# Patient Record
Sex: FEMALE | Race: WHITE | NOT HISPANIC OR LATINO | Employment: OTHER | URBAN - METROPOLITAN AREA
[De-identification: names, ages, dates, MRNs, and addresses within clinical notes are randomized per-mention and may not be internally consistent; named-entity substitution may affect disease eponyms.]

---

## 2017-01-17 ENCOUNTER — AMBULATORY - HEALTHEAST (OUTPATIENT)
Dept: CARDIOLOGY | Facility: CLINIC | Age: 67
End: 2017-01-17

## 2017-01-17 ENCOUNTER — OFFICE VISIT - HEALTHEAST (OUTPATIENT)
Dept: CARDIOLOGY | Facility: CLINIC | Age: 67
End: 2017-01-17

## 2017-01-17 DIAGNOSIS — I48.92 ATRIAL FLUTTER (H): ICD-10-CM

## 2017-01-17 DIAGNOSIS — I10 ESSENTIAL HYPERTENSION: ICD-10-CM

## 2017-01-17 ASSESSMENT — MIFFLIN-ST. JEOR: SCORE: 1429.98

## 2017-03-14 ENCOUNTER — AMBULATORY - HEALTHEAST (OUTPATIENT)
Dept: CARDIOLOGY | Facility: CLINIC | Age: 67
End: 2017-03-14

## 2017-03-14 ENCOUNTER — OFFICE VISIT - HEALTHEAST (OUTPATIENT)
Dept: CARDIOLOGY | Facility: CLINIC | Age: 67
End: 2017-03-14

## 2017-03-14 DIAGNOSIS — I42.9 CARDIOMYOPATHY (H): ICD-10-CM

## 2017-03-14 DIAGNOSIS — I48.0 PAROXYSMAL ATRIAL FIBRILLATION (H): ICD-10-CM

## 2017-03-14 DIAGNOSIS — R06.09 DOE (DYSPNEA ON EXERTION): ICD-10-CM

## 2017-03-14 DIAGNOSIS — I67.1 CEREBRAL ANEURYSM: ICD-10-CM

## 2017-03-14 DIAGNOSIS — I10 ESSENTIAL HYPERTENSION: ICD-10-CM

## 2017-03-14 DIAGNOSIS — I48.3 TYPICAL ATRIAL FLUTTER (H): ICD-10-CM

## 2017-03-14 ASSESSMENT — MIFFLIN-ST. JEOR: SCORE: 1452.66

## 2017-03-29 ENCOUNTER — HOSPITAL ENCOUNTER (OUTPATIENT)
Dept: CARDIOLOGY | Facility: CLINIC | Age: 67
Discharge: HOME | End: 2017-03-29
Attending: NURSE PRACTITIONER

## 2017-03-29 DIAGNOSIS — I48.0 PAROXYSMAL ATRIAL FIBRILLATION (H): ICD-10-CM

## 2017-03-29 DIAGNOSIS — I42.9 CARDIOMYOPATHY (H): ICD-10-CM

## 2017-03-29 LAB
AORTIC ROOT: 2.3 CM
AORTIC ROOT: 2.3 CM
BSA FOR ECHO PROCEDURE: 2.08 M2
CV BLOOD PRESSURE: NORMAL MMHG
CV ECHO HEIGHT: 62 IN
CV ECHO WEIGHT: 219 LBS
EJECTION FRACTION: 55 % (ref 55–75)
FRACTIONAL SHORTENING: 29.1 % (ref 28–44)
INTERVENTRICULAR SEPTUM IN END DIASTOLE: 0.92 CM (ref 0.6–0.9)
IVS/PW RATIO: 0.9
LEFT ATRIUM SIZE: 3.4 CM
LEFT ATRIUM TO AORTIC ROOT RATIO: 1.48 NO UNITS
LEFT VENTRICLE DIASTOLIC VOLUME INDEX: 32.2 CM3/M2 (ref 34–74)
LEFT VENTRICLE DIASTOLIC VOLUME: 67 CM3 (ref 46–106)
LEFT VENTRICLE MASS INDEX: 66.6 G/M2
LEFT VENTRICLE SYSTOLIC VOLUME INDEX: 14.4 CM3/M2 (ref 11–31)
LEFT VENTRICLE SYSTOLIC VOLUME: 30 CM3 (ref 14–42)
LEFT VENTRICULAR INTERNAL DIMENSION IN DIASTOLE: 4.4 CM (ref 3.8–5.2)
LEFT VENTRICULAR INTERNAL DIMENSION IN SYSTOLE: 3.12 CM (ref 2.2–3.5)
LEFT VENTRICULAR MASS: 138.5 G
LEFT VENTRICULAR POSTERIOR WALL IN END DIASTOLE: 0.99 CM (ref 0.6–0.9)
NUC REST DIASTOLIC VOLUME INDEX: 3504 LBS
NUC REST SYSTOLIC VOLUME INDEX: 62 IN

## 2017-03-29 ASSESSMENT — MIFFLIN-ST. JEOR: SCORE: 1456.63

## 2017-04-19 ENCOUNTER — AMBULATORY - HEALTHEAST (OUTPATIENT)
Dept: CARDIOLOGY | Facility: CLINIC | Age: 67
End: 2017-04-19

## 2017-04-24 ENCOUNTER — OFFICE VISIT - HEALTHEAST (OUTPATIENT)
Dept: CARDIOLOGY | Facility: CLINIC | Age: 67
End: 2017-04-24

## 2017-04-24 ENCOUNTER — AMBULATORY - HEALTHEAST (OUTPATIENT)
Dept: CARDIOLOGY | Facility: CLINIC | Age: 67
End: 2017-04-24

## 2017-04-24 DIAGNOSIS — I10 ESSENTIAL HYPERTENSION WITH GOAL BLOOD PRESSURE LESS THAN 140/90: ICD-10-CM

## 2017-04-24 DIAGNOSIS — I48.0 PAROXYSMAL ATRIAL FIBRILLATION (H): ICD-10-CM

## 2017-04-24 DIAGNOSIS — I48.3 TYPICAL ATRIAL FLUTTER (H): ICD-10-CM

## 2017-04-24 ASSESSMENT — MIFFLIN-ST. JEOR: SCORE: 1461.16

## 2017-06-01 ENCOUNTER — COMMUNICATION - HEALTHEAST (OUTPATIENT)
Dept: CARDIOLOGY | Facility: CLINIC | Age: 67
End: 2017-06-01

## 2017-06-12 ENCOUNTER — COMMUNICATION - HEALTHEAST (OUTPATIENT)
Dept: CARDIOLOGY | Facility: CLINIC | Age: 67
End: 2017-06-12

## 2017-07-12 ENCOUNTER — AMBULATORY - HEALTHEAST (OUTPATIENT)
Dept: CARDIOLOGY | Facility: CLINIC | Age: 67
End: 2017-07-12

## 2017-07-14 ENCOUNTER — OFFICE VISIT - HEALTHEAST (OUTPATIENT)
Dept: CARDIOLOGY | Facility: CLINIC | Age: 67
End: 2017-07-14

## 2017-07-14 ENCOUNTER — AMBULATORY - HEALTHEAST (OUTPATIENT)
Dept: CARDIOLOGY | Facility: CLINIC | Age: 67
End: 2017-07-14

## 2017-07-14 DIAGNOSIS — I20.89 ANGINAL EQUIVALENT (H): ICD-10-CM

## 2017-07-14 DIAGNOSIS — I48.0 PAROXYSMAL ATRIAL FIBRILLATION (H): ICD-10-CM

## 2017-07-14 DIAGNOSIS — R06.09 DOE (DYSPNEA ON EXERTION): ICD-10-CM

## 2017-07-14 DIAGNOSIS — I48.3 TYPICAL ATRIAL FLUTTER (H): ICD-10-CM

## 2017-07-14 DIAGNOSIS — I10 ESSENTIAL HYPERTENSION WITH GOAL BLOOD PRESSURE LESS THAN 140/90: ICD-10-CM

## 2017-07-14 ASSESSMENT — MIFFLIN-ST. JEOR: SCORE: 1447.56

## 2017-07-18 ENCOUNTER — RECORDS - HEALTHEAST (OUTPATIENT)
Dept: LAB | Facility: CLINIC | Age: 67
End: 2017-07-18

## 2017-07-18 LAB
CHOLEST SERPL-MCNC: 173 MG/DL
FASTING STATUS PATIENT QL REPORTED: NO
HDLC SERPL-MCNC: 43 MG/DL
LDLC SERPL CALC-MCNC: 105 MG/DL
TRIGL SERPL-MCNC: 125 MG/DL

## 2017-07-20 ENCOUNTER — AMBULATORY - HEALTHEAST (OUTPATIENT)
Dept: ADMINISTRATIVE | Facility: REHABILITATION | Age: 67
End: 2017-07-20

## 2017-07-20 DIAGNOSIS — H81.391 OTHER PERIPHERAL VERTIGO, RIGHT EAR: ICD-10-CM

## 2017-07-21 ENCOUNTER — OFFICE VISIT - HEALTHEAST (OUTPATIENT)
Dept: OCCUPATIONAL THERAPY | Facility: REHABILITATION | Age: 67
End: 2017-07-21

## 2017-07-21 DIAGNOSIS — R26.81 UNSTEADINESS ON FEET: ICD-10-CM

## 2017-07-21 DIAGNOSIS — H81.8X1 UNILATERAL VESTIBULAR WEAKNESS, RIGHT: ICD-10-CM

## 2017-07-21 DIAGNOSIS — R42 DIZZINESS: ICD-10-CM

## 2017-07-27 ENCOUNTER — COMMUNICATION - HEALTHEAST (OUTPATIENT)
Dept: CARDIOLOGY | Facility: CLINIC | Age: 67
End: 2017-07-27

## 2017-08-01 ENCOUNTER — HOSPITAL ENCOUNTER (OUTPATIENT)
Dept: CT IMAGING | Facility: CLINIC | Age: 67
Discharge: HOME | End: 2017-08-01
Attending: NURSE PRACTITIONER

## 2017-08-01 DIAGNOSIS — I20.89 ANGINAL EQUIVALENT (H): ICD-10-CM

## 2017-08-01 ASSESSMENT — MIFFLIN-ST. JEOR: SCORE: 1422.61

## 2017-08-03 ENCOUNTER — COMMUNICATION - HEALTHEAST (OUTPATIENT)
Dept: CARDIOLOGY | Facility: CLINIC | Age: 67
End: 2017-08-03

## 2017-08-04 ENCOUNTER — COMMUNICATION - HEALTHEAST (OUTPATIENT)
Dept: CARDIOLOGY | Facility: CLINIC | Age: 67
End: 2017-08-04

## 2017-08-04 ENCOUNTER — OFFICE VISIT - HEALTHEAST (OUTPATIENT)
Dept: OCCUPATIONAL THERAPY | Facility: REHABILITATION | Age: 67
End: 2017-08-04

## 2017-08-04 DIAGNOSIS — R42 DIZZINESS: ICD-10-CM

## 2017-08-04 DIAGNOSIS — R26.81 UNSTEADINESS ON FEET: ICD-10-CM

## 2017-08-04 DIAGNOSIS — I48.0 PAROXYSMAL ATRIAL FIBRILLATION (H): ICD-10-CM

## 2017-08-04 DIAGNOSIS — H81.8X1 UNILATERAL VESTIBULAR WEAKNESS, RIGHT: ICD-10-CM

## 2017-08-08 ENCOUNTER — HOSPITAL ENCOUNTER (OUTPATIENT)
Dept: CT IMAGING | Facility: CLINIC | Age: 67
Discharge: HOME | End: 2017-08-08
Attending: NURSE PRACTITIONER

## 2017-08-08 DIAGNOSIS — I20.89 ANGINAL EQUIVALENT (H): ICD-10-CM

## 2017-08-08 LAB
BSA FOR ECHO PROCEDURE: 2.05 M2
CV CALCIUM SCORE AGATSTON LM: 21
CV CALCIUM SCORING AGATSON LAD: 0
CV CALCIUM SCORING AGATSTON CX: 0
CV CALCIUM SCORING AGATSTON RCA: 32
CV CALCIUM SCORING AGATSTON TOTAL: 53

## 2017-08-08 ASSESSMENT — MIFFLIN-ST. JEOR: SCORE: 1422.61

## 2017-08-09 ENCOUNTER — COMMUNICATION - HEALTHEAST (OUTPATIENT)
Dept: CARDIOLOGY | Facility: CLINIC | Age: 67
End: 2017-08-09

## 2017-08-09 DIAGNOSIS — I48.19 PERSISTENT ATRIAL FIBRILLATION (H): ICD-10-CM

## 2017-08-09 DIAGNOSIS — R93.2 ABNORMAL CT OF LIVER: ICD-10-CM

## 2017-08-11 ENCOUNTER — HOSPITAL ENCOUNTER (OUTPATIENT)
Dept: ULTRASOUND IMAGING | Facility: CLINIC | Age: 67
Discharge: HOME | End: 2017-08-11
Attending: NURSE PRACTITIONER

## 2017-08-11 ENCOUNTER — HOSPITAL ENCOUNTER (OUTPATIENT)
Dept: CARDIOLOGY | Facility: CLINIC | Age: 67
Discharge: HOME | End: 2017-08-11
Attending: NURSE PRACTITIONER

## 2017-08-11 DIAGNOSIS — I48.19 PERSISTENT ATRIAL FIBRILLATION (H): ICD-10-CM

## 2017-08-11 DIAGNOSIS — R93.2 ABNORMAL CT OF LIVER: ICD-10-CM

## 2017-08-24 ENCOUNTER — RECORDS - HEALTHEAST (OUTPATIENT)
Dept: ADMINISTRATIVE | Facility: OTHER | Age: 67
End: 2017-08-24

## 2017-08-29 ENCOUNTER — AMBULATORY - HEALTHEAST (OUTPATIENT)
Dept: CARDIOLOGY | Facility: CLINIC | Age: 67
End: 2017-08-29

## 2017-08-29 ENCOUNTER — RECORDS - HEALTHEAST (OUTPATIENT)
Dept: ADMINISTRATIVE | Facility: OTHER | Age: 67
End: 2017-08-29

## 2017-08-30 ENCOUNTER — OFFICE VISIT - HEALTHEAST (OUTPATIENT)
Dept: CARDIOLOGY | Facility: CLINIC | Age: 67
End: 2017-08-30

## 2017-08-30 DIAGNOSIS — I48.0 PAROXYSMAL ATRIAL FIBRILLATION (H): ICD-10-CM

## 2017-08-30 DIAGNOSIS — I10 ESSENTIAL HYPERTENSION WITH GOAL BLOOD PRESSURE LESS THAN 140/90: ICD-10-CM

## 2017-08-30 DIAGNOSIS — I48.3 TYPICAL ATRIAL FLUTTER (H): ICD-10-CM

## 2017-08-30 LAB
ATRIAL RATE - MUSE: 102 BPM
DIASTOLIC BLOOD PRESSURE - MUSE: NORMAL MMHG
INTERPRETATION ECG - MUSE: NORMAL
P AXIS - MUSE: NORMAL DEGREES
PR INTERVAL - MUSE: NORMAL MS
QRS DURATION - MUSE: 90 MS
QT - MUSE: 344 MS
QTC - MUSE: 439 MS
R AXIS - MUSE: -10 DEGREES
SYSTOLIC BLOOD PRESSURE - MUSE: NORMAL MMHG
T AXIS - MUSE: 14 DEGREES
VENTRICULAR RATE- MUSE: 98 BPM

## 2017-08-30 ASSESSMENT — MIFFLIN-ST. JEOR: SCORE: 1432.14

## 2017-08-31 ENCOUNTER — SURGERY - HEALTHEAST (OUTPATIENT)
Dept: CARDIOLOGY | Facility: CLINIC | Age: 67
End: 2017-08-31

## 2017-08-31 ENCOUNTER — AMBULATORY - HEALTHEAST (OUTPATIENT)
Dept: CARDIOLOGY | Facility: CLINIC | Age: 67
End: 2017-08-31

## 2017-08-31 ENCOUNTER — RECORDS - HEALTHEAST (OUTPATIENT)
Dept: ADMINISTRATIVE | Facility: OTHER | Age: 67
End: 2017-08-31

## 2017-08-31 ENCOUNTER — COMMUNICATION - HEALTHEAST (OUTPATIENT)
Dept: CARDIOLOGY | Facility: CLINIC | Age: 67
End: 2017-08-31

## 2017-08-31 ENCOUNTER — RECORDS - HEALTHEAST (OUTPATIENT)
Dept: SLEEP MEDICINE | Facility: CLINIC | Age: 67
End: 2017-08-31

## 2017-08-31 DIAGNOSIS — I48.0 PAROXYSMAL ATRIAL FIBRILLATION (H): ICD-10-CM

## 2017-09-05 ENCOUNTER — COMMUNICATION - HEALTHEAST (OUTPATIENT)
Dept: CARDIOLOGY | Facility: CLINIC | Age: 67
End: 2017-09-05

## 2017-09-05 ENCOUNTER — AMBULATORY - HEALTHEAST (OUTPATIENT)
Dept: CARDIOLOGY | Facility: CLINIC | Age: 67
End: 2017-09-05

## 2017-09-05 DIAGNOSIS — I48.0 PAROXYSMAL A-FIB (H): ICD-10-CM

## 2017-09-06 ENCOUNTER — HOSPITAL ENCOUNTER (OUTPATIENT)
Dept: CT IMAGING | Facility: CLINIC | Age: 67
Discharge: HOME | End: 2017-09-06
Attending: INTERNAL MEDICINE

## 2017-09-06 DIAGNOSIS — I48.0 PAROXYSMAL ATRIAL FIBRILLATION (H): ICD-10-CM

## 2017-09-06 ASSESSMENT — MIFFLIN-ST. JEOR: SCORE: 1422.61

## 2017-09-08 ENCOUNTER — COMMUNICATION - HEALTHEAST (OUTPATIENT)
Dept: SLEEP MEDICINE | Facility: CLINIC | Age: 67
End: 2017-09-08

## 2017-09-08 LAB — BSA FOR ECHO PROCEDURE: 2.1 M2

## 2017-09-12 ENCOUNTER — SURGERY - HEALTHEAST (OUTPATIENT)
Dept: CARDIOLOGY | Facility: CLINIC | Age: 67
End: 2017-09-12

## 2017-09-12 ENCOUNTER — ANESTHESIA - HEALTHEAST (OUTPATIENT)
Dept: CARDIOLOGY | Facility: CLINIC | Age: 67
End: 2017-09-12

## 2017-09-12 ASSESSMENT — MIFFLIN-ST. JEOR: SCORE: 1429.87

## 2017-09-14 ENCOUNTER — AMBULATORY - HEALTHEAST (OUTPATIENT)
Dept: CARDIOLOGY | Facility: CLINIC | Age: 67
End: 2017-09-14

## 2017-09-15 ENCOUNTER — AMBULATORY - HEALTHEAST (OUTPATIENT)
Dept: CARDIOLOGY | Facility: CLINIC | Age: 67
End: 2017-09-15

## 2017-09-15 DIAGNOSIS — I48.91 ATRIAL FIBRILLATION (H): ICD-10-CM

## 2017-09-15 DIAGNOSIS — I48.0 PAROXYSMAL ATRIAL FIBRILLATION (H): ICD-10-CM

## 2017-09-15 DIAGNOSIS — Z98.890 STATUS POST CATHETER ABLATION OF ATRIAL FIBRILLATION: ICD-10-CM

## 2017-09-15 ASSESSMENT — MIFFLIN-ST. JEOR: SCORE: 1422.61

## 2017-09-21 ENCOUNTER — AMBULATORY - HEALTHEAST (OUTPATIENT)
Dept: CARDIOLOGY | Facility: CLINIC | Age: 67
End: 2017-09-21

## 2017-09-21 DIAGNOSIS — I48.91 ATRIAL FIBRILLATION (H): ICD-10-CM

## 2017-09-28 ENCOUNTER — AMBULATORY - HEALTHEAST (OUTPATIENT)
Dept: CARDIOLOGY | Facility: CLINIC | Age: 67
End: 2017-09-28

## 2017-09-28 DIAGNOSIS — I48.91 ATRIAL FIBRILLATION (H): ICD-10-CM

## 2017-10-05 ENCOUNTER — AMBULATORY - HEALTHEAST (OUTPATIENT)
Dept: CARDIOLOGY | Facility: CLINIC | Age: 67
End: 2017-10-05

## 2017-10-05 DIAGNOSIS — I48.91 ATRIAL FIBRILLATION (H): ICD-10-CM

## 2017-10-06 ENCOUNTER — OFFICE VISIT - HEALTHEAST (OUTPATIENT)
Dept: SLEEP MEDICINE | Facility: CLINIC | Age: 67
End: 2017-10-06

## 2017-10-06 DIAGNOSIS — G47.33 OSA (OBSTRUCTIVE SLEEP APNEA): ICD-10-CM

## 2017-10-06 DIAGNOSIS — E66.9 OBESITY: ICD-10-CM

## 2017-10-06 DIAGNOSIS — G47.8 SLEEP DYSFUNCTION WITH SLEEP STAGE DISTURBANCE: ICD-10-CM

## 2017-10-06 ASSESSMENT — MIFFLIN-ST. JEOR: SCORE: 1414

## 2017-10-12 ENCOUNTER — AMBULATORY - HEALTHEAST (OUTPATIENT)
Dept: CARDIOLOGY | Facility: CLINIC | Age: 67
End: 2017-10-12

## 2017-10-12 DIAGNOSIS — I48.91 ATRIAL FIBRILLATION (H): ICD-10-CM

## 2017-10-17 ENCOUNTER — AMBULATORY - HEALTHEAST (OUTPATIENT)
Dept: CARDIOLOGY | Facility: CLINIC | Age: 67
End: 2017-10-17

## 2017-10-17 ENCOUNTER — RECORDS - HEALTHEAST (OUTPATIENT)
Dept: ADMINISTRATIVE | Facility: OTHER | Age: 67
End: 2017-10-17

## 2017-10-24 ENCOUNTER — AMBULATORY - HEALTHEAST (OUTPATIENT)
Dept: CARDIOLOGY | Facility: CLINIC | Age: 67
End: 2017-10-24

## 2017-10-24 ENCOUNTER — OFFICE VISIT - HEALTHEAST (OUTPATIENT)
Dept: CARDIOLOGY | Facility: CLINIC | Age: 67
End: 2017-10-24

## 2017-10-24 DIAGNOSIS — R06.09 DYSPNEA ON EXERTION: ICD-10-CM

## 2017-10-24 DIAGNOSIS — I48.0 PAROXYSMAL ATRIAL FIBRILLATION (H): ICD-10-CM

## 2017-10-24 DIAGNOSIS — I10 ESSENTIAL HYPERTENSION: ICD-10-CM

## 2017-10-24 DIAGNOSIS — I42.9 CARDIOMYOPATHY (H): ICD-10-CM

## 2017-10-24 DIAGNOSIS — Z98.890 STATUS POST CATHETER ABLATION OF ATRIAL FIBRILLATION: ICD-10-CM

## 2017-10-24 DIAGNOSIS — I48.19 PERSISTENT ATRIAL FIBRILLATION (H): ICD-10-CM

## 2017-10-24 ASSESSMENT — MIFFLIN-ST. JEOR: SCORE: 1409.01

## 2017-10-26 ENCOUNTER — COMMUNICATION - HEALTHEAST (OUTPATIENT)
Dept: CARDIOLOGY | Facility: CLINIC | Age: 67
End: 2017-10-26

## 2017-10-31 ENCOUNTER — HOSPITAL ENCOUNTER (OUTPATIENT)
Dept: CT IMAGING | Facility: CLINIC | Age: 67
Discharge: HOME | End: 2017-10-31
Attending: NURSE PRACTITIONER

## 2017-10-31 DIAGNOSIS — R06.09 OTHER FORMS OF DYSPNEA: ICD-10-CM

## 2017-10-31 DIAGNOSIS — I42.9 CARDIOMYOPATHY (H): ICD-10-CM

## 2017-10-31 DIAGNOSIS — I48.19 PERSISTENT ATRIAL FIBRILLATION (H): ICD-10-CM

## 2017-10-31 DIAGNOSIS — R06.09 DYSPNEA ON EXERTION: ICD-10-CM

## 2017-10-31 LAB
BSA FOR ECHO PROCEDURE: 2.03 M2
LEFT VENTRICLE HEART RATE: 63 BPM

## 2017-10-31 ASSESSMENT — MIFFLIN-ST. JEOR: SCORE: 1409.01

## 2017-11-20 ENCOUNTER — HOSPITAL ENCOUNTER (OUTPATIENT)
Dept: CARDIOLOGY | Facility: CLINIC | Age: 67
Discharge: HOME | End: 2017-11-20
Attending: NURSE PRACTITIONER

## 2017-11-20 DIAGNOSIS — I48.19 PERSISTENT ATRIAL FIBRILLATION (H): ICD-10-CM

## 2017-11-28 ENCOUNTER — RECORDS - HEALTHEAST (OUTPATIENT)
Dept: ADMINISTRATIVE | Facility: OTHER | Age: 67
End: 2017-11-28

## 2017-12-04 ENCOUNTER — OFFICE VISIT - HEALTHEAST (OUTPATIENT)
Dept: CARDIOLOGY | Facility: CLINIC | Age: 67
End: 2017-12-04

## 2017-12-04 DIAGNOSIS — G47.33 OBSTRUCTIVE SLEEP APNEA: ICD-10-CM

## 2017-12-04 DIAGNOSIS — I48.19 PERSISTENT ATRIAL FIBRILLATION (H): ICD-10-CM

## 2017-12-04 DIAGNOSIS — I10 ESSENTIAL HYPERTENSION: ICD-10-CM

## 2017-12-04 DIAGNOSIS — Z98.890 STATUS POST CATHETER ABLATION OF ATRIAL FIBRILLATION: ICD-10-CM

## 2017-12-04 ASSESSMENT — MIFFLIN-ST. JEOR: SCORE: 1399.93

## 2017-12-13 ENCOUNTER — COMMUNICATION - HEALTHEAST (OUTPATIENT)
Dept: CARDIOLOGY | Facility: CLINIC | Age: 67
End: 2017-12-13

## 2018-02-28 ENCOUNTER — RECORDS - HEALTHEAST (OUTPATIENT)
Dept: ADMINISTRATIVE | Facility: OTHER | Age: 68
End: 2018-02-28

## 2018-02-28 ENCOUNTER — AMBULATORY - HEALTHEAST (OUTPATIENT)
Dept: CARDIOLOGY | Facility: CLINIC | Age: 68
End: 2018-02-28

## 2018-03-06 ENCOUNTER — OFFICE VISIT - HEALTHEAST (OUTPATIENT)
Dept: CARDIOLOGY | Facility: CLINIC | Age: 68
End: 2018-03-06

## 2018-03-06 ENCOUNTER — COMMUNICATION - HEALTHEAST (OUTPATIENT)
Dept: SLEEP MEDICINE | Facility: CLINIC | Age: 68
End: 2018-03-06

## 2018-03-06 DIAGNOSIS — I48.19 PERSISTENT ATRIAL FIBRILLATION (H): ICD-10-CM

## 2018-03-06 DIAGNOSIS — I10 ESSENTIAL HYPERTENSION: ICD-10-CM

## 2018-03-06 DIAGNOSIS — G47.33 OBSTRUCTIVE SLEEP APNEA: ICD-10-CM

## 2018-03-06 ASSESSMENT — MIFFLIN-ST. JEOR: SCORE: 1418.08

## 2018-05-08 ENCOUNTER — COMMUNICATION - HEALTHEAST (OUTPATIENT)
Dept: CARDIOLOGY | Facility: CLINIC | Age: 68
End: 2018-05-08

## 2018-05-08 DIAGNOSIS — I48.0 PAROXYSMAL ATRIAL FIBRILLATION (H): ICD-10-CM

## 2018-07-25 ENCOUNTER — RECORDS - HEALTHEAST (OUTPATIENT)
Dept: LAB | Facility: CLINIC | Age: 68
End: 2018-07-25

## 2018-07-25 LAB
ALBUMIN SERPL-MCNC: 3.7 G/DL (ref 3.5–5)
ALP SERPL-CCNC: 97 U/L (ref 45–120)
ALT SERPL W P-5'-P-CCNC: 53 U/L (ref 0–45)
ANION GAP SERPL CALCULATED.3IONS-SCNC: 12 MMOL/L (ref 5–18)
AST SERPL W P-5'-P-CCNC: 41 U/L (ref 0–40)
BILIRUB SERPL-MCNC: 0.4 MG/DL (ref 0–1)
BUN SERPL-MCNC: 18 MG/DL (ref 8–22)
CALCIUM SERPL-MCNC: 9.6 MG/DL (ref 8.5–10.5)
CHLORIDE BLD-SCNC: 104 MMOL/L (ref 98–107)
CHOLEST SERPL-MCNC: 183 MG/DL
CO2 SERPL-SCNC: 25 MMOL/L (ref 22–31)
CREAT SERPL-MCNC: 0.86 MG/DL (ref 0.6–1.1)
FASTING STATUS PATIENT QL REPORTED: ABNORMAL
GFR SERPL CREATININE-BSD FRML MDRD: >60 ML/MIN/1.73M2
GLUCOSE BLD-MCNC: 105 MG/DL (ref 70–125)
HDLC SERPL-MCNC: 43 MG/DL
LDLC SERPL CALC-MCNC: 96 MG/DL
POTASSIUM BLD-SCNC: 4 MMOL/L (ref 3.5–5)
PROT SERPL-MCNC: 7.2 G/DL (ref 6–8)
SODIUM SERPL-SCNC: 141 MMOL/L (ref 136–145)
TRIGL SERPL-MCNC: 221 MG/DL

## 2018-08-13 ENCOUNTER — COMMUNICATION - HEALTHEAST (OUTPATIENT)
Dept: SLEEP MEDICINE | Facility: CLINIC | Age: 68
End: 2018-08-13

## 2018-08-13 DIAGNOSIS — G47.33 OSA (OBSTRUCTIVE SLEEP APNEA): ICD-10-CM

## 2018-08-22 ENCOUNTER — RECORDS - HEALTHEAST (OUTPATIENT)
Dept: LAB | Facility: CLINIC | Age: 68
End: 2018-08-22

## 2018-08-22 LAB
ALT SERPL W P-5'-P-CCNC: 38 U/L (ref 0–45)
AST SERPL W P-5'-P-CCNC: 35 U/L (ref 0–40)
CHOLEST SERPL-MCNC: 207 MG/DL
FASTING STATUS PATIENT QL REPORTED: YES
HDLC SERPL-MCNC: 53 MG/DL
LDLC SERPL CALC-MCNC: 112 MG/DL
TRIGL SERPL-MCNC: 211 MG/DL

## 2018-09-11 ENCOUNTER — RECORDS - HEALTHEAST (OUTPATIENT)
Dept: ADMINISTRATIVE | Facility: OTHER | Age: 68
End: 2018-09-11

## 2018-09-11 ENCOUNTER — AMBULATORY - HEALTHEAST (OUTPATIENT)
Dept: CARDIOLOGY | Facility: CLINIC | Age: 68
End: 2018-09-11

## 2018-09-18 ENCOUNTER — OFFICE VISIT - HEALTHEAST (OUTPATIENT)
Dept: CARDIOLOGY | Facility: CLINIC | Age: 68
End: 2018-09-18

## 2018-09-18 DIAGNOSIS — I48.19 PERSISTENT ATRIAL FIBRILLATION (H): ICD-10-CM

## 2018-09-18 DIAGNOSIS — Z98.890 STATUS POST CATHETER ABLATION OF ATRIAL FIBRILLATION: ICD-10-CM

## 2018-09-18 DIAGNOSIS — I10 ESSENTIAL HYPERTENSION: ICD-10-CM

## 2018-09-18 DIAGNOSIS — G47.33 OBSTRUCTIVE SLEEP APNEA: ICD-10-CM

## 2018-09-18 ASSESSMENT — MIFFLIN-ST. JEOR: SCORE: 1438.49

## 2018-09-26 ENCOUNTER — COMMUNICATION - HEALTHEAST (OUTPATIENT)
Dept: SLEEP MEDICINE | Facility: CLINIC | Age: 68
End: 2018-09-26

## 2018-09-26 ENCOUNTER — AMBULATORY - HEALTHEAST (OUTPATIENT)
Dept: SLEEP MEDICINE | Facility: CLINIC | Age: 68
End: 2018-09-26

## 2018-09-26 DIAGNOSIS — G47.33 OSA (OBSTRUCTIVE SLEEP APNEA): ICD-10-CM

## 2018-10-05 ENCOUNTER — COMMUNICATION - HEALTHEAST (OUTPATIENT)
Dept: CARDIOLOGY | Facility: CLINIC | Age: 68
End: 2018-10-05

## 2018-11-26 ENCOUNTER — AMBULATORY - HEALTHEAST (OUTPATIENT)
Dept: CARDIOLOGY | Facility: CLINIC | Age: 68
End: 2018-11-26

## 2018-11-26 ENCOUNTER — COMMUNICATION - HEALTHEAST (OUTPATIENT)
Dept: CARDIOLOGY | Facility: CLINIC | Age: 68
End: 2018-11-26

## 2018-11-26 DIAGNOSIS — I48.0 PAROXYSMAL ATRIAL FIBRILLATION (H): ICD-10-CM

## 2019-03-04 ENCOUNTER — HOSPITAL ENCOUNTER (OUTPATIENT)
Dept: NUCLEAR MEDICINE | Facility: CLINIC | Age: 69
Discharge: HOME | End: 2019-03-04
Attending: INTERNAL MEDICINE

## 2019-03-04 ENCOUNTER — HOSPITAL ENCOUNTER (OUTPATIENT)
Dept: CARDIOLOGY | Facility: CLINIC | Age: 69
Discharge: HOME | End: 2019-03-04
Attending: INTERNAL MEDICINE

## 2019-03-04 DIAGNOSIS — R07.9 CHEST PAIN, UNSPECIFIED TYPE: ICD-10-CM

## 2019-03-04 LAB
CV STRESS MAX HR HE: 82
NUC STRESS EJECTION FRACTION: 71 %
STRESS ECHO BASELINE BP: NORMAL MM OF HG
STRESS ECHO BASELINE HR: 67 BPM
STRESS ECHO CALCULATED PERCENT HR: 55 %
STRESS ECHO LAST STRESS BP: NORMAL MM OF HG

## 2019-03-22 ENCOUNTER — OFFICE VISIT - HEALTHEAST (OUTPATIENT)
Dept: SLEEP MEDICINE | Facility: CLINIC | Age: 69
End: 2019-03-22

## 2019-03-22 DIAGNOSIS — G47.33 OBSTRUCTIVE SLEEP APNEA: ICD-10-CM

## 2019-03-22 ASSESSMENT — MIFFLIN-ST. JEOR: SCORE: 1431.69

## 2019-05-08 ENCOUNTER — RECORDS - HEALTHEAST (OUTPATIENT)
Dept: LAB | Facility: CLINIC | Age: 69
End: 2019-05-08

## 2019-05-08 LAB
BASOPHILS # BLD AUTO: 0.1 THOU/UL (ref 0–0.2)
BASOPHILS NFR BLD AUTO: 1 % (ref 0–2)
EOSINOPHIL # BLD AUTO: 0.9 THOU/UL (ref 0–0.4)
EOSINOPHIL NFR BLD AUTO: 9 % (ref 0–6)
ERYTHROCYTE [DISTWIDTH] IN BLOOD BY AUTOMATED COUNT: 13.4 % (ref 11–14.5)
HCT VFR BLD AUTO: 44.2 % (ref 35–47)
HGB BLD-MCNC: 14.3 G/DL (ref 12–16)
LYMPHOCYTES # BLD AUTO: 2.2 THOU/UL (ref 0.8–4.4)
LYMPHOCYTES NFR BLD AUTO: 21 % (ref 20–40)
MCH RBC QN AUTO: 28.3 PG (ref 27–34)
MCHC RBC AUTO-ENTMCNC: 32.4 G/DL (ref 32–36)
MCV RBC AUTO: 87 FL (ref 80–100)
MONOCYTES # BLD AUTO: 0.7 THOU/UL (ref 0–0.9)
MONOCYTES NFR BLD AUTO: 7 % (ref 2–10)
NEUTROPHILS # BLD AUTO: 6.4 THOU/UL (ref 2–7.7)
NEUTROPHILS NFR BLD AUTO: 62 % (ref 50–70)
PLATELET # BLD AUTO: 337 THOU/UL (ref 140–440)
PMV BLD AUTO: 10.7 FL (ref 8.5–12.5)
RBC # BLD AUTO: 5.06 MILL/UL (ref 3.8–5.4)
TSH SERPL DL<=0.005 MIU/L-ACNC: 1.77 UIU/ML (ref 0.3–5)
WBC: 10.2 THOU/UL (ref 4–11)

## 2019-09-04 ENCOUNTER — RECORDS - HEALTHEAST (OUTPATIENT)
Dept: LAB | Facility: CLINIC | Age: 69
End: 2019-09-04

## 2019-09-04 LAB
ALBUMIN SERPL-MCNC: 3.8 G/DL (ref 3.5–5)
ALP SERPL-CCNC: 94 U/L (ref 45–120)
ALT SERPL W P-5'-P-CCNC: 37 U/L (ref 0–45)
ANION GAP SERPL CALCULATED.3IONS-SCNC: 10 MMOL/L (ref 5–18)
AST SERPL W P-5'-P-CCNC: 35 U/L (ref 0–40)
BILIRUB SERPL-MCNC: 0.4 MG/DL (ref 0–1)
BUN SERPL-MCNC: 11 MG/DL (ref 8–28)
CALCIUM SERPL-MCNC: 9.6 MG/DL (ref 8.5–10.5)
CHLORIDE BLD-SCNC: 105 MMOL/L (ref 98–107)
CHOLEST SERPL-MCNC: 194 MG/DL
CO2 SERPL-SCNC: 27 MMOL/L (ref 22–31)
CREAT SERPL-MCNC: 0.89 MG/DL (ref 0.6–1.1)
FASTING STATUS PATIENT QL REPORTED: ABNORMAL
GFR SERPL CREATININE-BSD FRML MDRD: >60 ML/MIN/1.73M2
GLUCOSE BLD-MCNC: 96 MG/DL (ref 70–125)
HDLC SERPL-MCNC: 49 MG/DL
LDLC SERPL CALC-MCNC: 107 MG/DL
POTASSIUM BLD-SCNC: 4.2 MMOL/L (ref 3.5–5)
PROT SERPL-MCNC: 7.2 G/DL (ref 6–8)
SODIUM SERPL-SCNC: 142 MMOL/L (ref 136–145)
TRIGL SERPL-MCNC: 192 MG/DL

## 2019-09-10 ENCOUNTER — COMMUNICATION - HEALTHEAST (OUTPATIENT)
Dept: CARDIOLOGY | Facility: CLINIC | Age: 69
End: 2019-09-10

## 2019-09-11 ENCOUNTER — AMBULATORY - HEALTHEAST (OUTPATIENT)
Dept: CARDIOLOGY | Facility: CLINIC | Age: 69
End: 2019-09-11

## 2019-09-11 DIAGNOSIS — Z00.6 CLINICAL TRIAL PARTICIPANT: ICD-10-CM

## 2019-09-11 DIAGNOSIS — I48.0 PAROXYSMAL ATRIAL FIBRILLATION (H): ICD-10-CM

## 2019-09-11 DIAGNOSIS — I48.19 PERSISTENT ATRIAL FIBRILLATION (H): ICD-10-CM

## 2019-09-11 LAB
ATRIAL RATE - MUSE: 59 BPM
DIASTOLIC BLOOD PRESSURE - MUSE: NORMAL MMHG
INTERPRETATION ECG - MUSE: NORMAL
P AXIS - MUSE: 45 DEGREES
PR INTERVAL - MUSE: 170 MS
QRS DURATION - MUSE: 98 MS
QT - MUSE: 426 MS
QTC - MUSE: 421 MS
R AXIS - MUSE: 5 DEGREES
SYSTOLIC BLOOD PRESSURE - MUSE: NORMAL MMHG
T AXIS - MUSE: 19 DEGREES
VENTRICULAR RATE- MUSE: 59 BPM

## 2019-09-11 ASSESSMENT — MIFFLIN-ST. JEOR: SCORE: 1373.17

## 2019-09-16 ENCOUNTER — AMBULATORY - HEALTHEAST (OUTPATIENT)
Dept: CARDIOLOGY | Facility: CLINIC | Age: 69
End: 2019-09-16

## 2019-09-20 ENCOUNTER — AMBULATORY - HEALTHEAST (OUTPATIENT)
Dept: CARDIOLOGY | Facility: CLINIC | Age: 69
End: 2019-09-20

## 2019-09-20 ENCOUNTER — RECORDS - HEALTHEAST (OUTPATIENT)
Dept: ADMINISTRATIVE | Facility: OTHER | Age: 69
End: 2019-09-20

## 2019-09-26 ENCOUNTER — OFFICE VISIT - HEALTHEAST (OUTPATIENT)
Dept: CARDIOLOGY | Facility: CLINIC | Age: 69
End: 2019-09-26

## 2019-09-26 DIAGNOSIS — I48.19 PERSISTENT ATRIAL FIBRILLATION (H): ICD-10-CM

## 2019-09-26 DIAGNOSIS — I10 ESSENTIAL HYPERTENSION: ICD-10-CM

## 2019-09-26 DIAGNOSIS — G47.33 OSA ON CPAP: ICD-10-CM

## 2019-09-26 ASSESSMENT — MIFFLIN-ST. JEOR: SCORE: 1388.4

## 2020-03-02 ENCOUNTER — COMMUNICATION - HEALTHEAST (OUTPATIENT)
Dept: CARDIOLOGY | Facility: CLINIC | Age: 70
End: 2020-03-02

## 2020-08-05 VITALS — HEIGHT: 61 IN | BODY MASS INDEX: 40.59 KG/M2 | WEIGHT: 215 LBS

## 2020-08-05 RX ORDER — WARFARIN SODIUM 5 MG/1
2.5-5 TABLET ORAL DAILY
COMMUNITY

## 2020-08-05 RX ORDER — DILTIAZEM HYDROCHLORIDE 240 MG/1
240 CAPSULE, COATED, EXTENDED RELEASE ORAL
COMMUNITY
Start: 2019-09-26 | End: 2021-08-04

## 2020-08-05 RX ORDER — CHLORAL HYDRATE 500 MG
1200 CAPSULE ORAL DAILY
COMMUNITY

## 2020-08-05 RX ORDER — MULTIVITAMIN,THERAPEUTIC
1 TABLET ORAL DAILY
COMMUNITY

## 2020-08-05 RX ORDER — LISINOPRIL AND HYDROCHLOROTHIAZIDE 12.5; 2 MG/1; MG/1
1 TABLET ORAL DAILY
COMMUNITY

## 2020-08-05 ASSESSMENT — MIFFLIN-ST. JEOR: SCORE: 1427.61

## 2020-08-05 NOTE — PROGRESS NOTES
"Betty Mitchell is a 69 year old female who is being evaluated via a billable telephone visit.      The patient has been notified of following:     \"This telephone visit will be conducted via a call between you and your physician/provider. We have found that certain health care needs can be provided without the need for a physical exam.  This service lets us provide the care you need with a short phone conversation.  If a prescription is necessary we can send it directly to your pharmacy.  If lab work is needed we can place an order for that and you can then stop by our lab to have the test done at a later time.    Telephone visits are billed at different rates depending on your insurance coverage. During this emergency period, for some insurers they may be billed the same as an in-person visit.  Please reach out to your insurance provider with any questions.    If during the course of the call the physician/provider feels a telephone visit is not appropriate, you will not be charged for this service.\"    Patient has given verbal consent for Telephone visit?  Yes    What phone number would you like to be contacted at? [phone]     How would you like to obtain your AVS? Mail a copy    Phone call duration: 7 minutes    Thank you for the opportunity to participate in the care of Betty Mitchell.     She is a 69 year old y/o female patient who comes to the sleep medicine clinic for follow up.  The patient states that she continues to do well on her current CPAP setting.  She still feels refreshed upon awakening with usage.  The patient's review of system is otherwise negative.     Assessment and Plan:  In summary Betty Mitchell is a 69 year old year old female who is here for annual follow-up.    1. Obstructive sleep apnea  I congratulated the patient on her excellent CPAP usage.  I will keep her on the same pressure settings for now.  I welcomed the patient follow-up with me annually.  - COMPREHENSIVE " DME    Compliance Download data for 30 days:  Pressure setting: CPAP 9.8 CWP  Residual AHI: 0.6 events per hour  Leak: Minimal  Compliance: 100%  Mask Tolerance: Good  Skin irritation: None  DME: [facility]    Sleep-Wake Cycle:    The patient likes to initiate sleep at around 10 PM with a sleep latency of 1 hour. The patient has rare nocturnal awakenings. Final wake up time is around 6 AM-6:30 AM.    No past medical history on file.    No past surgical history on file.    Social History     Socioeconomic History     Marital status: Single     Spouse name: Not on file     Number of children: Not on file     Years of education: Not on file     Highest education level: Not on file   Occupational History     Not on file   Social Needs     Financial resource strain: Not on file     Food insecurity     Worry: Not on file     Inability: Not on file     Transportation needs     Medical: Not on file     Non-medical: Not on file   Tobacco Use     Smoking status: Not on file   Substance and Sexual Activity     Alcohol use: Not on file     Drug use: Not on file     Sexual activity: Not on file   Lifestyle     Physical activity     Days per week: Not on file     Minutes per session: Not on file     Stress: Not on file   Relationships     Social connections     Talks on phone: Not on file     Gets together: Not on file     Attends [employer] service: Not on file     Active member of club or organization: Not on file     Attends meetings of clubs or organizations: Not on file     Relationship status: Not on file     Intimate partner violence     Fear of current or ex partner: Not on file     Emotionally abused: Not on file     Physically abused: Not on file     Forced sexual activity: Not on file   Other Topics Concern     Not on file   Social History Narrative     Not on file       Current Outpatient Medications   Medication Sig Dispense Refill     diltiazem ER COATED BEADS (CARDIZEM CD/CARTIA XT) 240 MG 24 hr capsule Take 240  mg by mouth       lisinopril-hydrochlorothiazide (ZESTORETIC) 20-12.5 MG tablet Take 1 tablet by mouth daily       multivitamin, therapeutic (THERA-VIT) TABS tablet Take 1 tablet by mouth daily       Omega-3 1000 MG capsule Take 1,200 mg by mouth       warfarin ANTICOAGULANT (COUMADIN) 5 MG tablet Take 2.5-5 mg by mouth         No Known Allergies    Labs/Studies:    I reviewed the efficacy and compliance report from her device. Data summarized on the HPI and the PAP compliance flow sheet.        Patient verbalized understanding of these issues, agrees with the plan and all questions were answered today. Patient was given an opportuntity to voice any other symptoms or concerns not listed above. Patient did not have any other symptoms or concerns.      Ge Ingram DO  Board Certified in Internal Medicine and Sleep Medicine    (Note created with Dragon voice recognition and unintended spelling errors and word substitutions may occur)     Audio and visual devices were used for this virtual clinic visit with permission from patient.

## 2020-08-07 ENCOUNTER — VIRTUAL VISIT (OUTPATIENT)
Dept: SLEEP MEDICINE | Facility: CLINIC | Age: 70
End: 2020-08-07
Payer: COMMERCIAL

## 2020-08-07 DIAGNOSIS — G47.33 OBSTRUCTIVE SLEEP APNEA: Primary | ICD-10-CM

## 2020-08-07 PROCEDURE — 99212 OFFICE O/P EST SF 10 MIN: CPT | Mod: 95 | Performed by: INTERNAL MEDICINE

## 2020-09-16 ENCOUNTER — RECORDS - HEALTHEAST (OUTPATIENT)
Dept: LAB | Facility: CLINIC | Age: 70
End: 2020-09-16

## 2020-09-16 LAB
ALBUMIN SERPL-MCNC: 3.7 G/DL (ref 3.5–5)
ALP SERPL-CCNC: 83 U/L (ref 45–120)
ALT SERPL W P-5'-P-CCNC: 36 U/L (ref 0–45)
ANION GAP SERPL CALCULATED.3IONS-SCNC: 11 MMOL/L (ref 5–18)
AST SERPL W P-5'-P-CCNC: 34 U/L (ref 0–40)
BILIRUB SERPL-MCNC: 0.4 MG/DL (ref 0–1)
BUN SERPL-MCNC: 14 MG/DL (ref 8–28)
CALCIUM SERPL-MCNC: 9.2 MG/DL (ref 8.5–10.5)
CHLORIDE BLD-SCNC: 106 MMOL/L (ref 98–107)
CHOLEST SERPL-MCNC: 206 MG/DL
CO2 SERPL-SCNC: 24 MMOL/L (ref 22–31)
CREAT SERPL-MCNC: 0.87 MG/DL (ref 0.6–1.1)
FASTING STATUS PATIENT QL REPORTED: ABNORMAL
GFR SERPL CREATININE-BSD FRML MDRD: >60 ML/MIN/1.73M2
GLUCOSE BLD-MCNC: 99 MG/DL (ref 70–125)
HDLC SERPL-MCNC: 51 MG/DL
LDLC SERPL CALC-MCNC: 119 MG/DL
POTASSIUM BLD-SCNC: 3.9 MMOL/L (ref 3.5–5)
PROT SERPL-MCNC: 7.1 G/DL (ref 6–8)
SODIUM SERPL-SCNC: 141 MMOL/L (ref 136–145)
TRIGL SERPL-MCNC: 178 MG/DL
TSH SERPL DL<=0.005 MIU/L-ACNC: 2.27 UIU/ML (ref 0.3–5)

## 2020-11-08 ENCOUNTER — HEALTH MAINTENANCE LETTER (OUTPATIENT)
Age: 70
End: 2020-11-08

## 2020-11-09 ENCOUNTER — COMMUNICATION - HEALTHEAST (OUTPATIENT)
Dept: CARDIOLOGY | Facility: CLINIC | Age: 70
End: 2020-11-09

## 2020-11-09 DIAGNOSIS — I10 ESSENTIAL HYPERTENSION: ICD-10-CM

## 2020-11-30 ENCOUNTER — COMMUNICATION - HEALTHEAST (OUTPATIENT)
Dept: CARDIOLOGY | Facility: CLINIC | Age: 70
End: 2020-11-30

## 2020-12-03 ENCOUNTER — AMBULATORY - HEALTHEAST (OUTPATIENT)
Dept: CARDIOLOGY | Facility: CLINIC | Age: 70
End: 2020-12-03

## 2020-12-03 ENCOUNTER — RECORDS - HEALTHEAST (OUTPATIENT)
Dept: ADMINISTRATIVE | Facility: OTHER | Age: 70
End: 2020-12-03

## 2020-12-09 ENCOUNTER — OFFICE VISIT - HEALTHEAST (OUTPATIENT)
Dept: CARDIOLOGY | Facility: CLINIC | Age: 70
End: 2020-12-09

## 2020-12-09 DIAGNOSIS — G47.33 OSA ON CPAP: ICD-10-CM

## 2020-12-09 DIAGNOSIS — I10 ESSENTIAL HYPERTENSION: ICD-10-CM

## 2020-12-09 DIAGNOSIS — I48.19 PERSISTENT ATRIAL FIBRILLATION (H): ICD-10-CM

## 2020-12-09 DIAGNOSIS — E66.01 MORBID OBESITY (H): ICD-10-CM

## 2021-02-09 ENCOUNTER — COMMUNICATION - HEALTHEAST (OUTPATIENT)
Dept: CARDIOLOGY | Facility: CLINIC | Age: 71
End: 2021-02-09

## 2021-02-09 DIAGNOSIS — I10 ESSENTIAL HYPERTENSION: ICD-10-CM

## 2021-05-26 ENCOUNTER — RECORDS - HEALTHEAST (OUTPATIENT)
Dept: ADMINISTRATIVE | Facility: CLINIC | Age: 71
End: 2021-05-26

## 2021-05-26 NOTE — PROGRESS NOTES
Dear Dr. Grimm, Vel TRUJILLO MD  [address] [place] [address],    Thank you for the opportunity to participate in the care of Betty Mitchell.     She is a 68 y.o. y/o female patient who comes to the sleep medicine clinic for follow up.  This is the patient's first clinical visit since starting CPAP therapy.  She states that she has not noticed any change in her sleep quality or energy level.  She tolerates using the CPAP machine would like to continue using it.    Compliance Download data for 30 days:  Pressure setting: APAP 6-16 CWP  Residual AHI: 0.6 events per hour  Leak: Minimal  Compliance: 93%  Mask Tolerance: Good  Skin irritation: None      Past Medical History:   Diagnosis Date     Aneurysm, cerebral      Arrhythmia      Cancer (H)     right breast cancer      Hypertension      Persistent atrial fibrillation (H) 3/14/2017    Dx 2016 Symptomatic [id number] score = 3 (age/ female/ HTN) Rx sotalol Rx warfarin Moderate KATHYA Sept 2017 PVI Sept 2017     Status post catheter ablation of atrial fibrillation 9/12/2017    PVI Sept 12, 2017 (cryo-PVI  + RA-CTI line)       Past Surgical History:   Procedure Laterality Date     ANKLE SURGERY      X 2     BREAST LUMPECTOMY       BREAST LUMPECTOMY Right 2012     CARDIAC ELECTROPHYSIOLOGY MAPPING AND ABLATION  9/12/17    PVI     EP ABLATION AFLUTTER  9/12/2017    Procedure: EP Ablation Atrial Flutter;  Surgeon: Trevin Silveira MD;  Location: [facility];  Service:      EP ABLATION PVI Left 9/12/2017    PVI Sept 12, 2017---cryo to 6 PVs and RCTI with SWA     EYE SURGERY      X 2     TUBAL LIGATION         Social History     Socioeconomic History     Marital status: Single     Spouse name: Not on file     Number of children: Not on file     Years of education: Not on file     Highest education level: Not on file   Occupational History     Occupation: retired      Employer: [id number]   Social Needs     Financial resource strain: Not on file     Food  insecurity:     Worry: Not on file     Inability: Not on file     Transportation needs:     Medical: Not on file     Non-medical: Not on file   Tobacco Use     Smoking status: Never Smoker     Smokeless tobacco: Never Used   Substance and Sexual Activity     Alcohol use: Yes     Alcohol/week: 0.6 oz     Types: 1 Glasses of wine per week     Comment: <1/week     Drug use: No     Sexual activity: Not on file   Lifestyle     Physical activity:     Days per week: Not on file     Minutes per session: Not on file     Stress: Not on file   Relationships     Social connections:     Talks on phone: Not on file     Gets together: Not on file     Attends [employer] service: Not on file     Active member of club or organization: Not on file     Attends meetings of clubs or organizations: Not on file     Relationship status: Not on file     Intimate partner violence:     Fear of current or ex partner: Not on file     Emotionally abused: Not on file     Physically abused: Not on file     Forced sexual activity: Not on file   Other Topics Concern     Not on file   Social History Narrative     Not on file       Current Outpatient Medications   Medication Sig Dispense Refill     CALCIUM CARBONATE/VITAMIN D3 (CALCIUM 600 WITH VITAMIN D3 ORAL) Take 1 tablet by mouth daily.       diltiazem (CARDIZEM CD) 240 MG 24 hr capsule Take 240 mg by mouth daily after supper.       DOCOSAHEXANOIC ACID/EPA (FISH OIL ORAL) Take 1,200 mg by mouth daily.       FOLIC ACID/MULTIVIT-MIN/LUTEIN (CENTRUM SILVER ORAL) Take 1 tablet by mouth daily.       lisinopril-hydrochlorothiazide (PRINZIDE,ZESTORETIC) 20-12.5 mg per tablet Take 1 tablet by mouth daily.  1     loperamide (IMODIUM A-D) 2 mg tablet Take 1 tablet (2 mg total) by mouth 4 (four) times a day as needed for diarrhea.  0     warfarin (COUMADIN) 5 MG tablet Take 2.5-5 mg by mouth daily. 2.5mg (1/2 tablet) on Monday and 5mg (1 tablet) on all other days of the week             No current  "facility-administered medications for this visit.        No Known Allergies    Epworths Sleepiness Scale 10/6/2017 3/22/2019   Sitting and reading 0 0   Watching TV 0 0   Sitting, inactive in a public place (e.g. a theatre or a meeting) 0 0   As a passenger in a car for an hour without a break 0 0   Lying down to rest in the afternoon when circumstances permit 0 0   Sitting and talking to someone 0 0   Sitting quietly after a lunch without alcohol 0 0   In a car, while stopped for a few minutes in traffic 0 0   Total score 0 0       Physical Exam:  /72   Pulse 70   Ht 5' 1\" (1.549 m)   Wt 217 lb (98.4 kg)   SpO2 95%   BMI 41.00 kg/m    BMI:Body mass index is 41 kg/m .   GEN: NAD, obese  Psych: normal mood, normal affect    Labs/Studies:    I reviewed the efficacy and compliance report from his device. Data summarized on the HPI and the CPAP compliance flow sheet.     Lab Results   Component Value Date    WBC 8.5 02/24/2019    HGB 13.3 02/24/2019    HCT 41.5 02/24/2019    MCV 89 02/24/2019     02/24/2019         Chemistry        Component Value Date/Time     02/24/2019 0639    K 3.6 02/24/2019 0639     (H) 02/24/2019 0639    CO2 23 02/24/2019 0639    BUN 10 02/24/2019 0639    CREATININE 0.77 02/24/2019 0639    GLU 92 02/24/2019 0639        Component Value Date/Time    CALCIUM 8.8 02/24/2019 0639    ALKPHOS 78 02/24/2019 0639    AST 30 02/24/2019 0639    ALT 35 02/24/2019 0639    BILITOT 0.5 02/24/2019 0639            No results found for: FERRITIN         Assessment and Plan:  In summary Betty Mitchell is a 68 y.o. year old female who is here for follow-up.    1.  Obstructive sleep apnea on CPAP  I congratulated the patient on excellent CPAP usage.  I will narrow her CPAP pressure range to 9.8 CWP.  I had the patient test at this pressure setting in front of me and she felt it was comfortable.  I welcomed the patient to follow-up with me in 2 years.     Patient verbalized understanding " of these issues, agrees with the plan and all questions were answered today. Patient was given an opportuntity to voice any other symptoms or concerns not listed above. Patient did not have any other symptoms or concerns.      Ge Ingram DO  Board Certified in Internal Medicine and Sleep Medicine  [facility].    More than 50% of the encounter was used for counseling or coordination of care.    (Note created with Dragon voice recognition and unintended spelling errors and word substitutions may occur)

## 2021-05-30 VITALS — WEIGHT: 219 LBS | HEIGHT: 62 IN | BODY MASS INDEX: 40.3 KG/M2

## 2021-05-30 VITALS — HEIGHT: 62 IN | WEIGHT: 220 LBS | BODY MASS INDEX: 40.48 KG/M2

## 2021-05-30 VITALS — BODY MASS INDEX: 39.38 KG/M2 | HEIGHT: 62 IN | WEIGHT: 214 LBS

## 2021-05-30 VITALS — HEIGHT: 62 IN | WEIGHT: 219 LBS | BODY MASS INDEX: 40.3 KG/M2

## 2021-05-31 VITALS — BODY MASS INDEX: 40.23 KG/M2 | WEIGHT: 213.1 LBS | HEIGHT: 61 IN

## 2021-05-31 VITALS — HEIGHT: 62 IN | BODY MASS INDEX: 39.93 KG/M2 | WEIGHT: 217 LBS

## 2021-05-31 VITALS — HEIGHT: 61 IN | BODY MASS INDEX: 41.33 KG/M2 | WEIGHT: 218.9 LBS

## 2021-05-31 VITALS — HEIGHT: 61 IN | WEIGHT: 215 LBS | BODY MASS INDEX: 40.59 KG/M2

## 2021-05-31 VITALS — BODY MASS INDEX: 40.99 KG/M2 | WEIGHT: 217.1 LBS | HEIGHT: 61 IN

## 2021-05-31 VITALS — BODY MASS INDEX: 40.59 KG/M2 | WEIGHT: 215 LBS | HEIGHT: 61 IN

## 2021-05-31 VITALS — BODY MASS INDEX: 40.59 KG/M2 | HEIGHT: 61 IN | WEIGHT: 215 LBS

## 2021-05-31 VITALS — HEIGHT: 61 IN | WEIGHT: 212 LBS | BODY MASS INDEX: 40.02 KG/M2

## 2021-05-31 VITALS — BODY MASS INDEX: 40.02 KG/M2 | HEIGHT: 61 IN | WEIGHT: 212 LBS

## 2021-05-31 VITALS — WEIGHT: 210 LBS | HEIGHT: 61 IN | BODY MASS INDEX: 39.65 KG/M2

## 2021-06-01 VITALS — BODY MASS INDEX: 40.4 KG/M2 | HEIGHT: 61 IN | WEIGHT: 214 LBS

## 2021-06-01 NOTE — TELEPHONE ENCOUNTER
Zestar Study Consent Visit    Study description: ECG and PPG Study: Zestar Study      Betty Mitchell a 68 y.o. female , was contacted by today to discuss participation in the Zestar study.     The patient called the Clinical Trials Office to inquire about study participation.  The consent form was reviewed with the patient.     The review of the study included:    Study purpose     Conflict of interest    Device description      Study visits    Risks of participation    Benefits (if any)    Alternatives    Voluntary participation    Confidentiality     Compensation/costs of participation    Study stipends    Injury and legal rights    The subject was queried in regards to her willingness to continue and come in for scheduled appointment. her questions were answered to her satisfaction.   The patient has given his preliminary agreement to volunteer to participate in the above noted study.     Plan: Betty Mitchell will come to [facility] on 09/11/2019 to continue consent process. If she continues to agrees to participate, the study visit will be done on the same day.    Roula Mcdonald RN

## 2021-06-01 NOTE — PATIENT INSTRUCTIONS - HE
Betyt Mitchell,    It was a pleasure to see you today at the [facility].     My recommendations after this visit include:    Please call if symptoms of reoccurrence of atrial fibrillation.      To followup with me or Dr. Silveira in 1 year.      My contact information:  Rolando Martin CNP  After Hours or Scheduling  [phone]  My Nurse---Jayla Santa [phone]

## 2021-06-02 VITALS — BODY MASS INDEX: 41.25 KG/M2 | HEIGHT: 61 IN | WEIGHT: 218.5 LBS

## 2021-06-02 VITALS — BODY MASS INDEX: 40.97 KG/M2 | HEIGHT: 61 IN | WEIGHT: 217 LBS

## 2021-06-03 VITALS
HEART RATE: 63 BPM | DIASTOLIC BLOOD PRESSURE: 77 MMHG | BODY MASS INDEX: 38.53 KG/M2 | RESPIRATION RATE: 16 BRPM | HEIGHT: 61 IN | SYSTOLIC BLOOD PRESSURE: 141 MMHG | TEMPERATURE: 97.9 F | WEIGHT: 204.1 LBS

## 2021-06-03 VITALS
HEIGHT: 61 IN | RESPIRATION RATE: 16 BRPM | BODY MASS INDEX: 38.89 KG/M2 | WEIGHT: 206 LBS | SYSTOLIC BLOOD PRESSURE: 118 MMHG | HEART RATE: 64 BPM | DIASTOLIC BLOOD PRESSURE: 64 MMHG

## 2021-06-06 NOTE — TELEPHONE ENCOUNTER
Return call to patient.  She was discharged from  today after an overnight stay for chest pain.  Explained that she would be a great candidate for an RAC apt.  She is very agreeable this and will ask scheduling to contact the patient for an apt.   The patient had a TSH completed - abnormal. T4 normal but asking for free T3. RN added and pt made aware.

## 2021-06-06 NOTE — TELEPHONE ENCOUNTER
Noted.  Maritza    Caller: Betty     Primary cardiologist: Rolando KOHLER     Detailed reason for call: Betty states she declines making a RAC appointment as recommended by RN (MO) and that she saw Dr. Grimm 3/5/2020 where they discussed her symptoms which were more esophageal per patient. Please call back if you have questions.     Best phone number: [phone]     Best time to contact: Any     Ok to leave a detailed message? Y     Device? Y

## 2021-06-06 NOTE — TELEPHONE ENCOUNTER
----- Message from Nikole Peres sent at 3/2/2020 12:17 PM CST -----  Regarding: [facility]  Caller: Betty    Primary cardiologist: Dr. Silveira, Rolando Martin    Detailed reason for call: Betty states she was overnight at [facility] 3/1/2020 and she is to follow up with Dr. Silveira. Please call back with recommendation.     Best phone number: [phone]     Best time to contact: Today    Ok to leave a detailed message? Y    Device? N    Additional Info: Rolando is available in April, Dr. Silveira has no open appointments at this time.

## 2021-06-09 NOTE — PROGRESS NOTES
[facility]   Arrhythmia Clinic    Assessment/Plan:  Diagnoses and all orders for this visit:    Paroxysmal atrial fibrillation and atrial fib diagnosed with H&P for eye surgery.  She had a respiratory illness at that time as well.  She tells me that she never knows when she is in A. fib.  A. fib was noted recently when she had an INR at primary clinic and was unaware of it.  On sotalol 40 mg twice daily due to bradycardia.  She has fatigue on  Atenolol in the past and questioning on sotalol as well.  If we switch to rate control would recommend to switch to calcium channel blocker but only could do this if cardiomyopathy has resolved and this was discussed today.  If limited echo shows normal EF will recommend to stop sotalol and switched to diltiazem  mg p.o. Daily.  I discussed rate versus rhythm control and use antiarrhythmics and ablation only if symptoms with afib.  I discussed RACE and AFFIRM studies, which showed no change in longevity or significant events on rate control as long as avg HR < 100.  Both groups were on anticoagulation in studies if indicated by medical history .  I discussed since she generally is in paroxysmal A. fib Fitbit may be helpful and tracking heart rate control on medication for heart rate control.  I discussed that at times she is asymptomatic 1 A. fib is paroxysmal but when it becomes persistent and also sometimes noticed that people are symptomatic.  I discussed most common symptoms are indirect of fatigue and shortness of breath with exertion.    Betty has cerebral aneurysm which is asymptomatic.  It was to stop discovered because she has 3 siblings with cerebral aneurysm.  She fell on ice this winter and had black eyes on warfarin.  [id number] Score of 3.  Has bled score of 2. I discussed Watchman as alternative to anticoagulation and will discuss watchman further at the next visit.  To follow-up with me in 6 weeks.    -     Echo Limited; Future; Expected  date: 3/14/17    Typical atrial flutter seen in December 2016.  Asymptomatic.    Essential hypertension and just took her meds and may be why she has marginal hypertension today.  Discussed that she needs tighter blood pressure control with cerebral aneurysm.    Cerebral aneurysm and increased risk for bleeding with anticoagulation.  Will discuss further at next visit.  To stop aspirin as no other indication on questioning.    BERUMEN (dyspnea on exertion) and could be related to lower physical activity with foot issues limiting her walking.  Note nuclear stress test had a lot of breast attenuation so cannot rule out active coronary artery disease with cysts.  If EF is not normalized I recommended coronary CT.  Note she has a family history of MI/stents in her father and brother.    Cardiomyopathy and no signs or symptoms of decompensated heart failure today.  To do limited echo in 2-3 weeks.  Cardiomyopathy may be tachycardia mediated given 24-hour Holter demonstrates tendency towards rapid heart rates with activity.  -     Echo Limited; Future; Expected date: 3/14/17    Other orders  -     VIGAMOX 0.5 % ophthalmic solution;   -     prednisoLONE acetate (PRED-FORTE) 1 % ophthalmic suspension;     40 minutes were spent in face-to-face counseling regarding above diagnoses and options for treatment.    _____________________________________________________________________    Subjective:    I had the opportunity to see Betty Mitchell at the [facility]. Betty Mithcell is a 66 y.o. female and here for EP consult regarding paroxysmal atrial fib and noted typical atrial flutter on December 2016 twelve-lead EKG.  Betty Mitchell was first diagnosed with atrial fib when she returned from an out of country trip and developed a respiratory illness at the time.  She was likely in persistent A. fib though not documented as such at the time.  She was seen on multiple occasions to be in A. fib.  She obviously  converted to sinus rhythm on her own as noted to be in regular rhythm on cardiology follow-up since then.  She denies any symptoms with atrial fib and tells me that she never knows when she is in A. fib.  She occasionally feels palpitations which are limited to seconds to minutes and rapid.  I discussed this may be PAT.  24-hour Holter from October 2016 shows that she has much faster heart rates with activity in A. fib and coincides with age.  She complains of fatigue and shortness of breath with walking faster or stairs or going up a hill.  This is different from a year ago.  She denies any angina or anginal equivalent symptoms.  She had an episode of severe back pain for about 3 days off and on but appears musculoskeletal as positional.  She denies PND or peripheral edema.  ______________________________________________________________________    Problem List:  Patient Active Problem List   Diagnosis     Paroxysmal atrial fibrillation     Typical atrial flutter     Essential hypertension     Cerebral aneurysm     BERUMEN (dyspnea on exertion)     Cardiomyopathy     Medical History:  Past Medical History:   Diagnosis Date     Aneurysm     cerebral     Aneurysm, cerebral      Arrhythmia      Atrial fibrillation      Hypertension      Surgical History:  Past Surgical History:   Procedure Laterality Date     EYE SURGERY       Social History:  Social History   Substance Use Topics     Smoking status: Never Smoker     Smokeless tobacco: None     Alcohol use None      Comment: <1/week        Review of Systems: Review of Systems:   General: WNL  Eyes: WNL  Ears/Nose/Throat: WNL  Lungs: WNL  Heart: WNL  Stomach: WNL  Bladder: WNL  Muscle/Joints: WNL  Skin: WNL  Nervous System: WNL  Mental Health: WNL     Blood: WNL      Family History:  Family History   Problem Relation Age of Onset     Acute Myocardial Infarction Father      Breast cancer Sister      Coronary Stenting Brother 67     Cerebral aneurysm Brother 67     Cerebral  "aneurysm Sister 58         Allergies:  No Known Allergies  Medications:  Current Outpatient Prescriptions   Medication Sig Dispense Refill     CALCIUM CARBONATE/VITAMIN D3 (CALCIUM 600 WITH VITAMIN D3 ORAL) Take 1 tablet by mouth daily.       DOCOSAHEXANOIC ACID/EPA (FISH OIL ORAL) Take 1,200 mg by mouth daily.       FOLIC ACID/MULTIVIT-MIN/LUTEIN (CENTRUM SILVER ORAL) Take 1 tablet by mouth daily.       lisinopril-hydrochlorothiazide (PRINZIDE,ZESTORETIC) 20-12.5 mg per tablet Take 1 tablet by mouth daily.  1     sotalol (BETAPACE) 80 MG tablet TAKE 1/2 TABLET(40 MG) BY MOUTH TWICE DAILY 90 tablet 3     warfarin (COUMADIN) 5 MG tablet Take 5 mg by mouth daily. Take 1 tablet  by mouth daily Sunday, Tuesday, Thursday, Saturday. Take 1/2 tab on Monday, Wednesdays, Fridays Adjust dose based on INR results as directed.       prednisoLONE acetate (PRED-FORTE) 1 % ophthalmic suspension        VIGAMOX 0.5 % ophthalmic solution        No current facility-administered medications for this visit.        Objective:   Vital signs:  Visit Vitals     /90 (Patient Site: Right Arm, Patient Position: Sitting, Cuff Size: Adult Regular)     Pulse 60     Ht 5' 1.75\" (1.568 m)     Wt 219 lb (99.3 kg)     BMI 40.38 kg/m2         Physical Exam:    GENERAL APPEARANCE: Alert, cooperative and in no acute distress.  HEENT: No scleral icterus. No Xanthelasma. Oral mucuos membranes pink and moist.  NECK: JVP Nl.   CHEST: clear to auscultation  CARDIOVASCULAR: S1, S2 without murmur ,clicks or rubs. Regular, regular.  Radial and posterior tibial pulses are intact and symetric.   EXTREMITIES: No cyanosis, clubbing or edema.    Results personally reviewed:  Results for orders placed during the hospital encounter of 10/14/16   Echo Complete [ECH10] 10/14/2016      Summary   Rhythm c/w Atrial fibrillation.   Normal left ventricular size.   Normal left ventricle wall thickness.   E/e' is 8 to 15, which is equivocal for estimating LV filling " pressures .   Left ventricular ejection fraction is visually estimated to be   35-40%.Global hypokinesis   No regional wall motion abnormalities.   TAPSE is normal, which is consistent with normal right ventricular   systolic function.   Normal right ventricular size and systolic function.   Mild tricuspid regurgitation.   Estimated right ventricular systolic pressure is 30 mmHg.       Left Atrium   Mildly enlarged left atrium.      Right Atrium   Normal right atrial size.               Results for orders placed during the hospital encounter of 11/14/16   NM Pharmacologic Stress Test    Narrative FINDINGS: The Lexiscan stress and rest images demonstrate normal   left ventricular size with significant breat attenuation artifact without   reversible perfusion abnormalities. The gated images reveal mild global   hypokinesis with measured left ventricular ejection fraction is 42%.     CONCLUSION:    1. Lexiscan stress nuclear study is negative for clear evidence of   inducible myocardial ischemia.    2. There is significant breast attenuation artifact in current study.   Would consider alternative testing if high clinical suppression for for   obstructive coronary arteries disease given current study has decrease   sensitive for detecting anterior wall ischemia.  3. LVEF: 42% with global hypokinesis.        October 2016 24-hour Holter shows atrial fib throughout with ventricular response range 52-1 6 7.  Average ventricular response 95.  Tendency towards rapid rates with activity.    Results for orders placed or performed in visit on 12/05/16   ECG 12 lead   Result Value Ref Range    SYSTOLIC BLOOD PRESSURE  mmHg    DIASTOLIC BLOOD PRESSURE  mmHg    VENTRICULAR RATE 52 BPM    ATRIAL RATE 52 BPM    P-R INTERVAL 164 ms    QRS DURATION 82 ms    Q-T INTERVAL 440 ms    QTC CALCULATION (BEZET) 409 ms    P Axis 53 degrees    R AXIS 4 degrees    T AXIS 19 degrees    MUSE DIAGNOSIS       Sinus bradycardia  Otherwise normal  ECG  When compared with ECG of 02-DEC-2016 09:51,  Sinus rhythm has replaced Atrial flutter  Vent. rate has decreased BY  79 BPM  Confirmed by GUZMAN HUTCHINSON, STONEY LOC:JN ([id number]) on 12/5/2016 5:17:52 PM         TSH:   Lab Results   Component Value Date    TSH 2.09 09/27/2016     BNP:   Lab Results   Component Value Date    BNP 15 12/06/2013     BMP:  Lab Results   Component Value Date    CREATININE 0.82 09/27/2016    BUN 14 09/27/2016     09/27/2016    K 4.2 09/27/2016     (H) 09/27/2016    CO2 26 09/27/2016       This note has been dictated using voice recognition software. Any grammatical or context distortions are unintentional and inherent to the software.    CHANG PEREZ RN, [facility]  [phone]

## 2021-06-10 NOTE — PROGRESS NOTES
[facility]   Arrhythmia Clinic    Assessment/Plan:  Diagnoses and all orders for this visit:    Paroxysmal atrial fibrillation and appears asymptomatic right now with paroxysmal episodes.  I spent a lot of time at the last visit discussing rate versus rhythm control.  She assures me that she is okay with following rate control as she does not think she is symptomatic with A. fib.  On her Fitbit she may have had an episode in both March and April of A. fib but generally short.  Recommended to stop sotalol tomorrow and start diltiazem 120 mg p.o. daily.  Short of breath with activity such as normal walking more than room to room and will see if this improves with switch in medication.  If Betty proceeds with watchman device I will see her in follow-up with watchman.  If she decides not to schedule watchman I would like to see her back in clinic in 3 months.  Betty has cerebral aneurysm which is asymptomatic. It was to stop discovered because she has 3 siblings with cerebral aneurysm. She fell on ice this winter and had black eyes on warfarin. [id number] Score of 3. Has bled score of 2. I discussed Watchman as alternative to anticoagulation.    I spent time discussing pre-and post ARACELI as well as watchman procedure at length today.  I discussed its benefit is to protect her from stroke risk with less risk for bleeding with her cerebral aneurysm  or with future falls.  She complains of spontaneous bruising as well.  Multiple questions were answered regarding watchman.  I reviewed anticoagulation protocol at length today.  After discussion Betty wants to move ahead to schedule watchman but unclear exactly when she wants to do this.  I will have Judi call her with some dates if Dr. Silveira approves her for watchman device.  I don't recommend the amkellyt study.  -     diltiazem (CARDIZEM CD) 120 MG 24 hr capsule; Take 1 capsule (120 mg total) by mouth daily.  Dispense: 30 capsule; Refill: 6    Typical atrial  flutter history.    Essential hypertension with goal blood pressure less than 140/90 and well-controlled.    Cardiomyopathy resolved and therefore candidate for watchman.    40 minutes were spent in face-to-face counseling regarding above diagnoses and options for treatment.    ______________________________________________________________________    Subjective:    I had the opportunity to see Betty Mitchell at the [facility]. Betty Mitchell is a 66 y.o. female and here for EP followup re: paroxysmal atrial fib and noted typical atrial flutter in December 2016 twelve-lead EKG. Betty Mitchell was first diagnosed with atrial fib when she returned from an out of country trip and developed a respiratory illness at the time. She was likely in persistent A. fib though not documented as such at the time. She was seen on multiple occasions to be in A. fib. She obviously converted to sinus rhythm on her own as noted to be in regular rhythm on cardiology follow-up since then. She denies any symptoms with atrial fib and tells me that she never knows when she is in A. Fib.  She complains of easy bruising and bleeding prolonged since on warfarin.  If she bumps her leg she will have bleeding for 2 hours.    She has a known history of cerebral aneurysm and fell this winter with increased ecchymosis secondary to warfarin.   She is complaining of shortness of breath with walking anything more than room to room.  She also had some chest pressure once in the last year while watching TV.  There was no other associated symptoms.  She has not had a repeat of this episode and noactivity at the time.  To call me if she has further episodes of chest pressure or pain as discussed breast artifact on stress study so inconclusive.    ______________________________________________________________________    Problem List:  Patient Active Problem List   Diagnosis     Paroxysmal atrial fibrillation     Typical atrial flutter      Essential hypertension     Cerebral aneurysm     BERUMEN (dyspnea on exertion)     Medical History:  Past Medical History:   Diagnosis Date     Aneurysm     cerebral     Aneurysm, cerebral      Arrhythmia      Atrial fibrillation      Hypertension      Surgical History:  Past Surgical History:   Procedure Laterality Date     EYE SURGERY       Social History:  Social History   Substance Use Topics     Smoking status: Never Smoker     Smokeless tobacco: None     Alcohol use None      Comment: <1/week        Review of Systems: Review of Systems:   General: WNL  Eyes: WNL  Ears/Nose/Throat: WNL  Lungs: WNL  Heart: WNL  Stomach: WNL  Bladder: WNL  Muscle/Joints: WNL  Skin: WNL  Nervous System: WNL  Mental Health: WNL     Blood: WNL      Family History:  Family History   Problem Relation Age of Onset     Acute Myocardial Infarction Father      Breast cancer Sister      Coronary Stenting Brother 67     Cerebral aneurysm Brother 67     Cerebral aneurysm Sister 58         Allergies:  No Known Allergies  Medications:  Current Outpatient Prescriptions   Medication Sig Dispense Refill     CALCIUM CARBONATE/VITAMIN D3 (CALCIUM 600 WITH VITAMIN D3 ORAL) Take 1 tablet by mouth daily.       DOCOSAHEXANOIC ACID/EPA (FISH OIL ORAL) Take 1,200 mg by mouth daily.       FOLIC ACID/MULTIVIT-MIN/LUTEIN (CENTRUM SILVER ORAL) Take 1 tablet by mouth daily.       lisinopril-hydrochlorothiazide (PRINZIDE,ZESTORETIC) 20-12.5 mg per tablet Take 1 tablet by mouth daily.  1     warfarin (COUMADIN) 5 MG tablet Take 5 mg by mouth daily. Take 1 tablet  by mouth daily Sunday, Tuesday, Thursday, Saturday. Take 1/2 tab on Monday, Wednesdays, Fridays Adjust dose based on INR results as directed.       diltiazem (CARDIZEM CD) 120 MG 24 hr capsule Take 1 capsule (120 mg total) by mouth daily. 30 capsule 6     prednisoLONE acetate (PRED-FORTE) 1 % ophthalmic suspension        VIGAMOX 0.5 % ophthalmic solution        No current facility-administered  "medications for this visit.        Objective:   Vital signs:  /78 (Patient Site: Right Arm, Patient Position: Sitting, Cuff Size: Adult Regular)  Pulse 60  Resp 16  Ht 5' 2\" (1.575 m)  Wt 220 lb (99.8 kg)  BMI 40.24 kg/m2      Physical Exam:    GENERAL APPEARANCE: Alert, cooperative and in no acute distress.  HEENT: No scleral icterus. No Xanthelasma. Oral mucuos membranes pink and moist.  NECK: JVP Nl.   CHEST: clear to auscultation  CARDIOVASCULAR: S1, S2 without murmur ,clicks or rubs. Regular, regular.  Radial and posterior tibial pulses are intact and symetric.   EXTREMITIES: No cyanosis, clubbing or edema.    Results personally reviewed:  Results for orders placed during the hospital encounter of 03/29/17   Echo [facility] [[facility]] 03/29/2017    Narrative   When compared to the previous study dated 10/14/2016, left ventricular   systolic function is significantly improved to normal range.    Left ventricle ejection fraction is normal. The calculated left   ventricular ejection fraction is 55%. No regional wall motion abnormality.    Normal right ventricle size and function.    No obvious valvular disease.           Results for orders placed during the hospital encounter of 11/14/16   NM Pharmacologic Stress Test    Narrative     FINDINGS: FINDINGS: The Lexiscan stress and rest images demonstrate normal left ventricular size with significant breat attenuation artifact without   reversible perfusion abnormalities. The gated images reveal mild global   hypokinesis with measured left ventricular ejection fraction is 42%.     CONCLUSION:    1. Lexiscan stress nuclear study is negative for clear evidence of   inducible myocardial ischemia.    2. There is significant breast attenuation artifact in current study.   Would consider alternative testing if high clinical suppression for for   obstructive coronary arteries disease given current study has decrease   sensitive for detecting anterior wall ischemia.  3. " LVEF: 42% with global hypokinesis.        October 2016 24-hour Holter shows atrial fib throughout with ventricular response range 52-1 6 7. Average ventricular response 95. Tendency towards rapid rates with activity.   October 2016 echo shows EF 35-40% and global hypokinesis.     Results for orders placed or performed in visit on 12/05/16   ECG 12 lead   Result Value Ref Range    SYSTOLIC BLOOD PRESSURE  mmHg    DIASTOLIC BLOOD PRESSURE  mmHg    VENTRICULAR RATE 52 BPM    ATRIAL RATE 52 BPM    P-R INTERVAL 164 ms    QRS DURATION 82 ms    Q-T INTERVAL 440 ms    QTC CALCULATION (BEZET) 409 ms    P Axis 53 degrees    R AXIS 4 degrees    T AXIS 19 degrees    MUSE DIAGNOSIS       Sinus bradycardia  Otherwise normal ECG  When compared with ECG of 02-DEC-2016 09:51,  Sinus rhythm has replaced Atrial flutter  Vent. rate has decreased BY  79 BPM  Confirmed by STONEY HINDS MD LOC:JN ([id number]) on 12/5/2016 5:17:52 PM           TSH:   Lab Results   Component Value Date    TSH 2.09 09/27/2016     BNP:   Lab Results   Component Value Date    BNP 15 12/06/2013     BMP:  Lab Results   Component Value Date    CREATININE 0.82 09/27/2016    BUN 14 09/27/2016     09/27/2016    K 4.2 09/27/2016     (H) 09/27/2016    CO2 26 09/27/2016       This note has been dictated using voice recognition software. Any grammatical or context distortions are unintentional and inherent to the software.    CHANG PEREZ RN, [facility] HEART [facility]  [phone]

## 2021-06-10 NOTE — PROGRESS NOTES
Chart reviewed.  [id number] risk score = 3  HAS-BLED  Score = 2  Hx of falls and known cerebral aneurysm as indication to come off warfarin.    Ok to schedule screening ARACELI study.

## 2021-06-11 NOTE — PROGRESS NOTES
[facility] HEART [facility]   Arrhythmia Clinic    Assessment/Plan:  Diagnoses and all orders for this visit:    Paroxysmal atrial fibrillation and [id number] score of 3 and has bled score of 2.  Discussed watchman device in April 2017 and decided to proceed with this but unfortunately medical supplement to Medicare did not cover it.  Betty is considering getting other insurance.  She has a rich family history of cerebral aneurysm and has one herself.  She would like to be off of anticoagulation is understands that this would be safer for her.  To follow-up with me in 6 months to and will see if there is any change in insurance status at that time.  Also following for paroxysmal A. fib and tells me that she is noticing at times that she knows that she is out of rhythm.  It is not happening frequently but she just feels something different in her chest when it occurs.  She is in sinus rhythm today with heart rate in the 80 at rest.  She was noted to be in A. fib when she saw Dr. Grimm  last month and heart rate noted to be in the 80s on that visit.  Having vertigo but I do not think it is related to diltiazem 120 mg p.o. daily.  No change for now.    Typical atrial flutter history.    Essential hypertension with goal blood pressure less than 140/90 well-controlled.    BERUMEN (dyspnea on exertion) and had nuclear stress test last fall but there was a lot of breast attenuation.  Cardiologist recommended an alternative test if high clinical suspicion.  Her EF has normalized based on limited echo in March 2017.  However, dyspnea on exertion is continuing and rich family history of premature coronary artery disease in father and brother.  She is quite active for her age.  I recommended coronary CT to rule out active coronary artery disease.  Discussed specifics of that test.  Will call her with results.  -     CTA Coronary W Calcium Score; Future; Expected date:  17    ______________________________________________________________________    Subjective:    I had the opportunity to see Betty Mitchell at the [facility]. Betty Mitchell is a 66 y.o. female and here for EP follow-up regarding paroxysmal atrial fib and typical atrial flutter. in 2016 twelve-lead EKG. Betty Mitchell was first diagnosed with atrial fib when she returned from [place] and developed a respiratory illness at the time. She was likely in persistent A. fib though not documented as such at the time. She was seen on multiple occasions to be in A. fib. She obviously converted to sinus rhythm on her own as noted to be in regular rhythm on cardiology follow-up since then.  She is reporting that occasionally she feels unusual irregularity in her chest and has been in clinic for INR with 1 of these and noted to be in A. fib.  She is thinking that she has now learned to identify when she is in A. fib.  She is having a turning in her head even when she is sitting and has had not no motion.  She was diagnosed with vertigo and is going to be undergoing physical therapy for this.  She has had this happen in the past and physical therapy was helpful.  She is doing her own housework and yard work.  She tells me that she is noticing the last 2 years that she is not able to cut the lawn all the way through and has to rest because she is so short of breath.  This is  unusual for her.  She notices that she gets breathless with more walking as well.  She denies any chest discomfort or pain.  Her father  in his mid 50s of MI and she has a brother with coronary artery disease and got stents.    ______________________________________________________________________    Problem List:  Patient Active Problem List   Diagnosis     Paroxysmal atrial fibrillation     Typical atrial flutter     Essential hypertension     Cerebral aneurysm     BERUMEN (dyspnea on exertion)     Medical  History:  Past Medical History:   Diagnosis Date     Aneurysm     cerebral     Aneurysm, cerebral      Arrhythmia      Atrial fibrillation      Hypertension      Surgical History:  Past Surgical History:   Procedure Laterality Date     EYE SURGERY       Social History:  Social History   Substance Use Topics     Smoking status: Never Smoker     Smokeless tobacco: Never Used     Alcohol use None      Comment: <1/week        Review of Systems: Review of Systems:   General: WNL  Eyes: WNL  Ears/Nose/Throat: WNL  Lungs: WNL  Heart: Shortness of Breath with activity  Stomach: WNL  Bladder: WNL  Muscle/Joints: WNL  Skin: WNL  Nervous System: Dizziness  Mental Health: WNL     Blood: WNL      Family History:  Family History   Problem Relation Age of Onset     Acute Myocardial Infarction Father      Breast cancer Sister      Coronary Stenting Brother 67     Cerebral aneurysm Brother 67     Cerebral aneurysm Sister 58         Allergies:  No Known Allergies  Medications:  Current Outpatient Prescriptions   Medication Sig Dispense Refill     CALCIUM CARBONATE/VITAMIN D3 (CALCIUM 600 WITH VITAMIN D3 ORAL) Take 1 tablet by mouth daily.       diltiazem (CARDIZEM CD) 120 MG 24 hr capsule Take 1 capsule (120 mg total) by mouth daily. 30 capsule 6     DOCOSAHEXANOIC ACID/EPA (FISH OIL ORAL) Take 1,200 mg by mouth daily.       FOLIC ACID/MULTIVIT-MIN/LUTEIN (CENTRUM SILVER ORAL) Take 1 tablet by mouth daily.       lisinopril-hydrochlorothiazide (PRINZIDE,ZESTORETIC) 20-12.5 mg per tablet Take 1 tablet by mouth daily.  1     warfarin (COUMADIN) 5 MG tablet Take 5 mg by mouth daily. Take 1 tablet  by mouth daily Sunday, Tuesday, Thursday, Saturday. Take 1/2 tab on Monday, Wednesdays, Fridays Adjust dose based on INR results as directed.       prednisoLONE acetate (PRED-FORTE) 1 % ophthalmic suspension        VIGAMOX 0.5 % ophthalmic solution        No current facility-administered medications for this visit.        Objective:   Vital  "signs:  /78 (Patient Site: Left Arm, Patient Position: Sitting, Cuff Size: Adult Large)  Pulse 80  Resp 16  Ht 5' 2\" (1.575 m)  Wt 217 lb (98.4 kg)  BMI 39.69 kg/m2      Physical Exam:    GENERAL APPEARANCE: Alert, cooperative and in no acute distress.  HEENT: No scleral icterus. No Xanthelasma. Oral mucuos membranes pink and moist.  NECK: JVP Nl.   CHEST: clear to auscultation  CARDIOVASCULAR: S1, S2 without murmur ,clicks or rubs. Regular, regular.  Radial and posterior tibial pulses are intact and symetric.   EXTREMITIES: No cyanosis, clubbing or edema.    Results personally reviewed:  Results for orders placed during the hospital encounter of 03/29/17   Echo [employer] [[facility]] 03/29/2017    Narrative   When compared to the previous study dated 10/14/2016, left ventricular   systolic function is significantly improved to normal range.    Left ventricle ejection fraction is normal. The calculated left   ventricular ejection fraction is 55%. No regional wall motion abnormality.    Normal right ventricle size and function.    No obvious valvular disease.           Results for orders placed during the hospital encounter of 11/14/16   NM Pharmacologic Stress Test     Narrative       FINDINGS: FINDINGS: The Lexiscan stress and rest images demonstrate normal left ventricular size with significant breat attenuation artifact without   reversible perfusion abnormalities. The gated images reveal mild global   hypokinesis with measured left ventricular ejection fraction is 42%.      CONCLUSION:    1. Lexiscan stress nuclear study is negative for clear evidence of   inducible myocardial ischemia.    2. There is significant breast attenuation artifact in current study.   Would consider alternative testing if high clinical suppression for for   obstructive coronary arteries disease given current study has decrease   sensitive for detecting anterior wall ischemia.  3. LVEF: 42% with global hypokinesis.       October 2016 " 24-hour Holter shows atrial fib throughout with ventricular response range 52-1 6 7. Average ventricular response 95. Tendency towards rapid rates with activity.   October 2016 echo shows EF 35-40% and global hypokinesis.         Results for orders placed or performed in visit on 12/05/16   ECG 12 lead   Result Value Ref Range    SYSTOLIC BLOOD PRESSURE  mmHg    DIASTOLIC BLOOD PRESSURE  mmHg    VENTRICULAR RATE 52 BPM    ATRIAL RATE 52 BPM    P-R INTERVAL 164 ms    QRS DURATION 82 ms    Q-T INTERVAL 440 ms    QTC CALCULATION (BEZET) 409 ms    P Axis 53 degrees    R AXIS 4 degrees    T AXIS 19 degrees    MUSE DIAGNOSIS       Sinus bradycardia  Otherwise normal ECG  When compared with ECG of 02-DEC-2016 09:51,  Sinus rhythm has replaced Atrial flutter  Vent. rate has decreased BY  79 BPM  Confirmed by GUZMAN HUTCHINSON, STONEY LOC:JN ([id number]) on 12/5/2016 5:17:52 PM           TSH:   Lab Results   Component Value Date    TSH 2.09 09/27/2016     BNP:   Lab Results   Component Value Date    BNP 15 12/06/2013     BMP:  Lab Results   Component Value Date    CREATININE 0.82 09/27/2016    BUN 14 09/27/2016     09/27/2016    K 4.2 09/27/2016     (H) 09/27/2016    CO2 26 09/27/2016       This note has been dictated using voice recognition software. Any grammatical or context distortions are unintentional and inherent to the software.    CHANG PEREZ RN, [facility] HEART [facility]  [phone]

## 2021-06-12 NOTE — ANESTHESIA PREPROCEDURE EVALUATION
Anesthesia Evaluation        Airway   Mallampati: II  Neck ROM: full   Pulmonary - normal exam   (+) shortness of breath, sleep apnea,                          Cardiovascular   (+) hypertension, dysrhythmias, ,     ECG reviewed  Rhythm: regular  Rate: normal,      ROS comment: Echo 9/11/17:   Left Ventricle  Normal left ventricular size and systolic performance with a visually estimated ejection fraction of 55-60%. There is normal regional wall motion. Left ventricular wall thickness is normal.     Right Ventricle  Normal right ventricular size and systolic performance.    Left Atrium  There is mild left atrial enlargement.    Right Atrium  The right atrium is of normal size.    IVC  Normal size of the inferior vena cava.    Aortic Valve  The aortic valve is comprised of three cusps. There is no significant aortic stenosis.There is trace aortic insufficiency.    Mitral Valve  The mitral valve appears morphologically normal. There is mild mitral insufficiency.    Tricuspid Valve  The tricuspid valve is grossly morphologically normal. There is mild tricuspid insufficiency.    Pulmonic Valve  The pulmonic valve is grossly morphologically normal.    Thoracic Aorta  The aortic root and proximal ascending aorta are of normal dimension.    Pericardium  There is no significant pericardial effusion.            Neuro/Psych      Endo/Other    (+) obesity (BMI 41.33),      GI/Hepatic/Renal       Other findings: Obese      Dental - normal exam                        Anesthesia Plan  Planned anesthetic: general endotracheal  Propofol infusion for PONV prophylaxis please  Decadron 10  zofran 4  ASA 3   Induction: intravenous   Anesthetic plan and risks discussed with: patient  Anesthesia plan special considerations: antiemetics, IV therapy two IVs,   Post-op plan: routine recovery

## 2021-06-12 NOTE — PROGRESS NOTES
[facility] Discharge Summary  Patient Name: Betty Micthell  Date: 9/5/2017  Referral Diagnosis: BPPV  Referring provider: Vel Grimm MD  Visit Diagnosis:   1. Unilateral vestibular weakness, right     2. Dizziness     3. Unsteadiness on feet         Goal status: Goals Met  Patient will be independent with home exercise program in: 2 weeks  Patient will be able to walk: on uneven/inclined surfaces;without loss of balance;in 12 weeks  Patient will bend: to dress;to clean;to do yard work;without dizziness;without loss of balance;in 12 weeks  Patient will turn head: without dizziness;for driving;for conversation;in 12 weeks    Patient was seen for 2 visits between 7-21-17 and 8-4-17.    Therapy will be discontinued at this time.  The patient will need a new referral to resume.    Thank you for your referral.  Edda Posey  9/5/2017  7:34 AM

## 2021-06-12 NOTE — PROGRESS NOTES
Pt presents to ct at  today for coronary cta.  Pt's rhythm is afib, ventricular rate 's.  /79. Pt is asymptomatic Pt took diltiazem last evening (takes at hs).  Pt states she can rarely tell when she's in afib anymore.  Encouraged pt to check pulse. Study cancelled today as scanning with this high of HR would produce suboptimal images.  Will send message to Rolando Martin to discuss next plan.  Mitali Call  RN

## 2021-06-12 NOTE — ANESTHESIA POSTPROCEDURE EVALUATION
Patient: Betty Mitchell  EP Ablation PVI - GENERAL ANESTHESIA WHOLE CASE, 6HRS W/ICE, MELANIE BOOKED, NO DEVICE, H&P 8/30, TEACH-LENA (HASMUKH), EP Ablation Atrial Flutter  Anesthesia type: general    Patient location: Telemetry/Step Down Unit  Last vitals:   Vitals:    09/12/17 1400   BP: 117/70   Pulse: 64   Resp: 14   Temp: 36.6  C (97.9  F)   SpO2: 97%     Post vital signs: stable  Level of consciousness: awake and responds to simple questions  Post-anesthesia pain: pain controlled  Post-anesthesia nausea and vomiting: no  Pulmonary: unassisted, return to baseline  Cardiovascular: stable and blood pressure at baseline  Hydration: adequate  Anesthetic events: no    QCDR Measures:  ASA# 11 - Ofelia-op Cardiac Arrest: ASA11B - Patient did NOT experience unanticipated cardiac arrest  ASA# 12 - Ofelia-op Mortality Rate: ASA12B - Patient did NOT die  ASA# 13 - PACU Re-Intubation Rate: ASA13B - Patient did NOT require a new airway mgmt  ASA# 10 - Composite Anes Safety: ASA10A - No serious adverse event    Additional Notes:

## 2021-06-12 NOTE — PROGRESS NOTES
[facility] Daily Progress     Patient Name: Betty Mitchell  Date: 2017  Visit #: 2  Referral Diagnosis: BPPV  Referring provider: Vel Grimm MD  Visit Diagnosis:     ICD-10-CM    1. Unilateral vestibular weakness, right H81.21    2. Dizziness R42    3. Unsteadiness on feet R26.81          Assessment:     Patient is appropriate to continue with skilled occupational therapy intervention, as indicated by initial plan of care. Patient reports a significant improvement in dizziness and balance.    Goal Status:  Patient will be independent with home exercise program in: 2 weeks  Patient will be able to walk: on uneven/inclined surfaces;without loss of balance;in 12 weeks  Patient will bend: to dress;to clean;to do yard work;without dizziness;without loss of balance;in 12 weeks  Patient will turn head: without dizziness;for driving;for conversation;in 12 weeks    Plan / Patient Education:     Continue with initial plan of care. Progress with home program as tolerated.    Subjective:     Pain Ratin    Objective:   Subjective progress relative to last visit:  Intensity of dizziness is:  less  Frequency of dizziness is: less  Duration of dizziness is: less  Balance is:  unchanged    Positional Tests: Not tested    Treatment Today:  X1 viewing-45 seconds-continue  Targets-45 seconds-continue  Normal surface eyes closed-discontinue  Perturbed surface eyes closed-continue  VOR suppression-did not provoke  Wall rolls-2 rolls-instructed    TREATMENT MINUTES COMMENTS   Evaluation     Self-care/ Home management     Neuromuscular Re-education 25    Canalith repositioning procedure           Total 25    Blank areas are intentional and mean the treatment did not include these items.          Edda Posey  2017  7:58 AM

## 2021-06-12 NOTE — PROGRESS NOTES
Coronary CT Angiogram:  Unable to complete CCTA again today due to patient being in an un controlled a-fib. Rate varies from  on monitor. Patient asymptomatic at rest beside some right LE edema that she was advised to contact clinic about and some SOB with exertion. Contacted supervising cardiologist, Dr. Chaudahri, who cancelled CCTA and asked order to be changed to just a calcium score.  Sue Murry RN  Cardiology Special Procedures

## 2021-06-12 NOTE — PROGRESS NOTES
[facility] HEART [facility]   Arrhythmia Clinic    Assessment/Plan:  Diagnoses and all orders for this visit:    Paroxysmal atrial fibrillation and did a recent 24-hour Holter which showed 40% of the time she was in A. fib.  She had clear symptoms.  She has fatigue and shortness of breath with activity with A. fib.  She is also noting now that she is having an irregular feeling in her chest when she is in A. fib.  She tells me that she thinks she is learning to identify A. fib over time,  especially if she is active.  She has RVR with activity with average heart rate of 100-110 and tendency towards lower heart rates at nighttime with longest R to R interval of 2.8 seconds.  She is on diltiazem 240 mg p.o. daily at the time of the Holter.  She was previously on sotalol but decreased to low dose due to bradycardia.  She tells me that she had vertigo at the time and has been to physical therapy for this and thinks that at the time she related lightheadedness to sotalol and may have been her vertigo.  She has been in A. fib both times I sent her for a CTA, which I ordered because strong family history of coronary artery disease and BERUMEN.  Therefore I only got a calcium score which places her at moderate to high risk of coronary artery disease in the next 10 years.  I discussed that we will need to control her risk factor of high cholesterol tight  to prevent development of coronary artery disease.  I recommend that she complete CTA when her A. fib is controlled .  [id number] score of 3 and on warfarin.  Her INR has been stable.  I have previously seen Betty to discuss watchman device but her insurance declined to pay for it so she is hoping to get new insurance in the new year.  She is at increased risk for bleeding but discussed that PVI would not necessarily get her off of anticoagulation.  She would still be looking at watchman device at some point in the future when her insurance will cover it.  Her INR today was 1.8 and  will increase weekly dose by 2.5 mg.  To take 2.5 mg on Monday and Friday and 5 mg the other 5 of 7 days.  Repeat INR in 1 week at primary clinic.  Discussed more frequent monitoring of INR will be needed after PVI.  I discussed antiarrhythmics versus PVI to treat atrial fib.  I discussed the difference.  I cannot use class Ic antiarrhythmics with nebulous  stress test.   I discussed Tikosyn is the best option for her with regard to antiarrhythmics  But is more expensive and requires 3 day hospital admission.  Discussed normal anatomy and physiology of heart in sinus rhythm versus atrial fibrillation.  Discussed natural progression of atrial fibrillation but high individual variability and how this relates to success of PVI as well.  Discussed  pulmonary vein procedure in detail .  Discussed  purpose to get off or avoid antiarrhythmics but not necessarily anticoagulation and 20% chance of reoccurrence of atrial fib.  Discussed risk of procedure as but not limited to the following and <1% risk of each:  cardiac perforation, (tamponade),  embolic events like CVA or MI   death    I discussed need for pretesting of Cardiac  CT within the next week or so and ARACELI within a few days of PVI.    I discussed overnight stay in hospital with PVI and activity restrictions after.     After discussion, Betty Mitchell wants to proceed with PVI with Dr. Silveira if in agreemnt.  60 mins were spent in visit discussing afib and its  treatment with focus on PVI.     Question KATHYA is reported snoring by friends when she travels.  She has stop bang score of 5 of 8 but [place] sleepiness score is low at 2.  Referred for split-night sleep study and then to see sleep physician after study.  Details of this were discussed at length today.  Discussed the association between KATHYA and atrial fib.  Scheduling of sleep study will need to be done around PVI and traveling to [place] in early November.  -     Split Night Sleep Study; Future  -      Ambulatory referral to Sleep Medicine  -     ECG 12 lead with MUSE  -     POCT INR    Typical atrial flutter seen on EKG in December 2 of 2016 and will need to be treated at time of PVI.    Essential hypertension with goal blood pressure less than 140/90 and well-controlled.    ______________________________________________________________________    Subjective:    I had the opportunity to see Betty Mitchell at the [facility]. Betty Mitchell is a 66 y.o. female and here for urgent EP appointment due to 24-hour Holter showing multiple episodes of paroxysmal atrial fib and high A. fib burden and clearly symptomatic with afib ankit with activity.  Betty Mitchell was diagnosed with paroxysmal atrial fib in September 2016.  She has been seen on multiple occasions in atrial fib and once in December 2016 also seen and typical atrial flutter.  She was on sotalol for a while but dose decreased to 40 mg twice daily due to bradycardia.  I have been unclear if she is symptomatic until this visit.  I saw her about 2 months ago for dyspnea on exertion and unclear at that time if it was related to paroxysmal atrial fib or active coronary artery disease.  Her nuclear stress test had a lot of breast attenuation so unclear that this was negative.  I therefore ordered a calcium score with Ct of coronary arteries.  From recent tests it appears to me that she is having increased frequent episodes of paroxysmal atrial fib and Betty agrees with this.  It is limiting her daily activities.  I think she is having A. fib most days but is always been paroxysmal.  She has never needed a cardioversion.  She has not had a good trial of antiarrhythmics but discussed that this is not required prior to ablation now.  She is in A. fib today but tells me that she did not realize that but common that symptoms occur only with activity. ______________________________________________________________________    Problem List:  Patient  Active Problem List   Diagnosis     Paroxysmal atrial fibrillation     Typical atrial flutter     Essential hypertension     Cerebral aneurysm     BERUMEN (dyspnea on exertion)     Medical History:  Past Medical History:   Diagnosis Date     Aneurysm     cerebral     Aneurysm, cerebral      Arrhythmia      Atrial fibrillation      Cancer     right breast cancer      Hypertension      Surgical History:  Past Surgical History:   Procedure Laterality Date     ANKLE SURGERY      X 2     BREAST LUMPECTOMY       EYE SURGERY      X 2     TUBAL LIGATION       Social History:  Social History   Substance Use Topics     Smoking status: Never Smoker     Smokeless tobacco: Never Used     Alcohol use None      Comment: <1/week        Review of Systems: Review of Systems:   General: WNL  Eyes: WNL  Ears/Nose/Throat: WNL  Lungs: WNL  Heart: Shortness of Breath with activity, Irregular Heartbeat, Leg Swelling  Stomach: WNL  Bladder: WNL  Muscle/Joints: WNL  Skin: WNL  Nervous System: WNL  Mental Health: WNL     Blood: WNL      Family History:  Family History   Problem Relation Age of Onset     Acute Myocardial Infarction Father      Breast cancer Sister      Coronary Stenting Brother 67     Cerebral aneurysm Brother 67     Cerebral aneurysm Sister 58     No Medical Problems Daughter      COPD Sister      No Medical Problems Sister      No Medical Problems Sister      Coronary Stenting Brother      Diabetes Brother      Alcohol abuse Brother          Allergies:  No Known Allergies  Medications:  Current Outpatient Prescriptions   Medication Sig Dispense Refill     CALCIUM CARBONATE/VITAMIN D3 (CALCIUM 600 WITH VITAMIN D3 ORAL) Take 1 tablet by mouth daily.       diltiazem (CARDIZEM CD) 240 MG 24 hr capsule Take 1 capsule (240 mg total) by mouth daily. 90 capsule 3     DOCOSAHEXANOIC ACID/EPA (FISH OIL ORAL) Take 1,200 mg by mouth daily.       FOLIC ACID/MULTIVIT-MIN/LUTEIN (CENTRUM SILVER ORAL) Take 1 tablet by mouth daily.        "lisinopril-hydrochlorothiazide (PRINZIDE,ZESTORETIC) 20-12.5 mg per tablet Take 1 tablet by mouth daily.  1     warfarin (COUMADIN) 5 MG tablet Take 5 mg by mouth daily. Take 1 tablet  by mouth daily Sunday, Tuesday, Thursday, Saturday. Take 1/2 tab on Monday, Wednesdays, Fridays Adjust dose based on INR results as directed.       No current facility-administered medications for this visit.        Objective:   Vital signs:  /78 (Patient Site: Left Arm, Patient Position: Sitting, Cuff Size: Adult Large)  Pulse 69  Resp 16  Ht 5' 1\" (1.549 m)  Wt 217 lb 1.6 oz (98.5 kg)  BMI 41.02 kg/m2      Physical Exam:    GENERAL APPEARANCE: Alert, cooperative and in no acute distress.  HEENT: No scleral icterus. No Xanthelasma. Oral mucuos membranes pink and moist.  NECK: JVP Nl.   CHEST: clear to auscultation  CARDIOVASCULAR: S1, S2 without murmur ,clicks or rubs. Irregular, irregular.  Radial and posterior tibial pulses are intact and symetric.   EXTREMITIES: No cyanosis, clubbing or edema.    Results personally reviewed:  Results for orders placed during the hospital encounter of 03/29/17   Echo Limited [[facility]] 03/29/2017    Narrative   When compared to the previous study dated 10/14/2016, left ventricular   systolic function is significantly improved to normal range.    Left ventricle ejection fraction is normal. The calculated left   ventricular ejection fraction is 55%. No regional wall motion abnormality.    Normal right ventricle size and function.    No obvious valvular disease.           Results for orders placed during the hospital encounter of 11/14/16   NM Pharmacologic Stress Test      Narrative         FINDINGS: FINDINGS: The Lexiscan stress and rest images demonstrate normal left ventricular size with significant breat attenuation artifact without   reversible perfusion abnormalities. The gated images reveal mild global   hypokinesis with measured left ventricular ejection fraction is 42%. "       CONCLUSION:    1. Lexiscan stress nuclear study is negative for clear evidence of   inducible myocardial ischemia.    2. There is significant breast attenuation artifact in current study.   Would consider alternative testing if high clinical suppression for for   obstructive coronary arteries disease given current study has decrease   sensitive for detecting anterior wall ischemia.  3. LVEF: 42% with global hypokinesis.        October 2016 24-hour Holter shows atrial fib throughout with ventricular response range 52-1 6 7. Average ventricular response 95. Tendency towards rapid rates with activity.   October 2016 echo shows EF 35-40% and global hypokinesis.      August 2017 24-hour Holter shows:  Impression:    Abnormal Holter monitor tracing with evidence of sustained atrial fibrillation with mildly elevated ventricular response.    The patient's symptoms do appear to correlate with atrial fibrillation.    No significant ventricular arrhythmia.    No profound bradycardia or pauses.     July 2017 CT calcium score of 53 with moderate to high risk of CAD.    Unable to do CTA due to A. fib with RVR.      Results for orders placed or performed in visit on 08/30/17   ECG 12 lead with MUSE   Result Value Ref Range    SYSTOLIC BLOOD PRESSURE  mmHg    DIASTOLIC BLOOD PRESSURE  mmHg    VENTRICULAR RATE 98 BPM    ATRIAL RATE 102 BPM    P-R INTERVAL  ms    QRS DURATION 90 ms    Q-T INTERVAL 344 ms    QTC CALCULATION (BEZET) 439 ms    P Axis  degrees    R AXIS -10 degrees    T AXIS 14 degrees    MUSE DIAGNOSIS       Atrial fibrillation  Nonspecific ST abnormality , probably digitalis effect  Abnormal ECG  When compared with ECG of 05-DEC-2016 10:39,  Atrial fibrillation has replaced Sinus rhythm  Vent. rate has increased BY  46 BPM  ST now depressed in Inferior leads         TSH:   Lab Results   Component Value Date    TSH 2.09 09/27/2016     BNP:   Lab Results   Component Value Date    BNP 15 12/06/2013     BMP:  Lab  Results   Component Value Date    CREATININE 0.78 07/18/2017    BUN 17 07/18/2017     07/18/2017    K 3.6 07/18/2017     (H) 07/18/2017    CO2 20 (L) 07/18/2017       This note has been dictated using voice recognition software. Any grammatical or context distortions are unintentional and inherent to the software.    CHANG PEREZ RN, [facility]  [phone]

## 2021-06-12 NOTE — ANESTHESIA CARE TRANSFER NOTE
Monitored transport to inpatient room. O2 via simple mask 8lpm, uneventful transfer, report to RN.    Last vitals:   Vitals:    09/12/17 0659   Pulse: 98   Resp: 18   Temp: 36.6  C (97.9  F)   SpO2: 99%     Patient's level of consciousness is drowsy  Spontaneous respirations: yes  Maintains airway independently: yes  Dentition unchanged: yes  Oropharynx: oropharynx clear of all foreign objects    QCDR Measures:  ASA# 20 - Surgical Safety Checklist: WHO surgical safety checklist completed prior to induction  PQRS# 430 - Adult PONV Prevention: 4558F - Pt received => 2 anti-emetic agents (different classes) preop & intraop  ASA# 8 - Peds PONV Prevention: NA - Not pediatric patient, not GA or 2 or more risk factors NOT present  PQRS# 424 - [place]-op Temp Management: 4559F - At least one body temp DOCUMENTED => 35.5C or 95.9F within required timeframe  PQRS# 426 - PACU Transfer Protocol: - Transfer of care checklist used  ASA# 14 - Acute Post-op Pain: ASA14B - Patient did NOT experience pain >= 7 out of 10

## 2021-06-12 NOTE — PROGRESS NOTES
1950  [phone] (home)  There is no such number on file (mobile).     INR's    7-18   2.3  8-21   2.3  8-30   1.8  9-6     2.4    +++Important patient information for CSC/Cath Lab staff : None+++    [facility] EP Cath Lab Procedure Order   Ablation Type:  PVI- Atrial Fibrillation  Specific location of pathway: Left    Diagnosis:  AF  Anticipated Case Duration:  6  Scheduling Needs/Timeframe:  Rolando has discussed with SWA and the patient:  they would like 9-12-17    MD Preference: Dr Raoul HUTCHINSON Assist:  No Specific MD:  N/A    Current Device: None None  Device Company/Device Rep Needed for Procedure: None    Pre-Procedural Testing needed: ARACELI and CT  Mapping System Required:  MELANIE  ICE Needed:  Yes  Anesthesia:  General-Whole Case  Research Protocol:  No    [facility] EP Cath Lab Prep   Ordering Provider: Rolando Martin NP  Ordering Date: 8/31/2017  Orders Status: Intial order placed and Order set placed  EP NC Contact: Judi Herrera RN    H&P:  Compled by Rolando Martin  on 8-30-17  PCP: Vel Grimm MD, [phone]    Pre-op Labs: ordered am of ARACELI    Medical Records Pertinent for Procedure:  Holter 8-14-17 afib, CT/MRI CT Ca score 8-8-17, Echo 4-17-17 EF 55% and EKG 8-30-17 Afib @98    Patient Education:    Teach with Patient: Completed via phone prior to procedure    Risks Reviewed:     Pulmonary Vein/AF/Radiofrequency Ablation  In addition to standard risks for Radiofrequency Ablation, there is:    <2% for significant pulmonary vein stenosis    <2% risk for embolic events    <1% risk for esophageal fistula    <1% risk death    Pulmonary Vein Isolation / Cryoablation Risks:    1-2% risk for phrenic nerve paralysis    <1% risk for pulmonary vein stenosis    Risk of esophageal irritation with no incidence of atrial-esophageal fistula    Rare cryoballoon rupture  <1% risk death       Cardiac Catheter Ablation    <1% risk for the following: hypotension, hemorrhage, vascular injury including perforation of vein, artery  or heart, thrombophlebitis, systemic or pulmonic emboli; cardiac perforation, (tamponade), infection, pneumothorax, arrhythmias, proarrhythmic effects of drugs, radiation exposure.    1-2% complete heart block (for AVNRT or septol accessory pathway).    <0.5% CVA or MI    <0.1% death    If external defibrillation is needed, 75% risk for superficial burn.    1-2% tamponade and aortic puncture with left sided transeptal approach for left side MELANIE - increase risk of CVA to <2%.    Late arrhythmia recurrences depends upon the primary rhythm disturbance.          Consent: Will be obtained in [facility] day of procedure    Pre-Procedure Instructions that were Reviewed with Patient:  NPO after midnight, Notified patient of time and date of procedure by CV , Transportation arrangements needed s/p procedure, Post-procedure follow up process, Sedation plan/orders and Pre-procedure letter was sent to pt by CV     Pre-Procedure Medication Instructions:  Instructions given to pt regarding anticoagulants: Coumadin- instructed to continue anticoagulation uninterrupted through their procedure  Instructions given to pt regarding antiarrhythmic medication: None; N/A  Instructions for medication, other than anticoagulants/antiarrhythmics listed above, given to pt: to take all morning medications with small sips of water, with the exception of OTC supplements and MVI am of ARACELI;  No medications am of procedure    No Known Allergies    Current Outpatient Prescriptions:      CALCIUM CARBONATE/VITAMIN D3 (CALCIUM 600 WITH VITAMIN D3 ORAL), Take 1 tablet by mouth daily., Disp: , Rfl:      diltiazem (CARDIZEM CD) 240 MG 24 hr capsule, Take 1 capsule (240 mg total) by mouth daily., Disp: 90 capsule, Rfl: 3     DOCOSAHEXANOIC ACID/EPA (FISH OIL ORAL), Take 1,200 mg by mouth daily., Disp: , Rfl:      FOLIC ACID/MULTIVIT-MIN/LUTEIN (CENTRUM SILVER ORAL), Take 1 tablet by mouth daily., Disp: , Rfl:      lisinopril-hydrochlorothiazide  (PRINZIDE,ZESTORETIC) 20-12.5 mg per tablet, Take 1 tablet by mouth daily., Disp: , Rfl: 1     warfarin (COUMADIN) 5 MG tablet, Take 5 mg by mouth daily. Take 1 tablet  by mouth daily Sunday, Tuesday, Thursday, Saturday. Take 1/2 tab on Monday, Wednesdays, Fridays Adjust dose based on INR results as directed., Disp: , Rfl:

## 2021-06-12 NOTE — PROGRESS NOTES
[facility] Certification Request    July 21, 2017      Patient: Betty Mitchell  MR Number: [id number]  YOB: 1950  Date of Visit: 7/21/2017      Dear Chelsy Argueta PA-C:    Thank you for this referral.   We are seeing Betty Mitchell in Occupational Therapy for vertigo.    Medicare and/or Medicaid requires physician review and approval of the treatment plan. Please review the plan of care and verify that you agree with the therapy plan of care by co-signing this note.      Plan of Care  Authorization / Certification Start Date: 07/21/17  Authorization / Certification End Date: 10/19/17  Authorization / Certification Number of Visits: 12  Communication with: Referral Source  Patient Related Instruction: Nature of Condition;Treatment plan and rationale;Basis of treatment;Expected outcome  Neuromuscular Reeducation: vestibular  Canolith Repostioning:      Goals:  Patient will be independent with home exercise program in: 2 weeks  Patient will be able to walk: on uneven/inclined surfaces;without loss of balance;in 12 weeks  Patient will bend: to dress;to clean;to do yard work;without dizziness;without loss of balance;in 12 weeks  Patient will turn head: without dizziness;for driving;for conversation;in 12 weeks      If you have any questions or concerns, please don't hesitate to call.    Sincerely,      Edda Posey, OT        Physician recommendation:     ___ Follow therapist's recommendation        ___ Modify therapy      *Physician co-signature indicates they certify the need for these services furnished within this plan and while under their care.      [facility]   Vestibular Initial Evaluation    Patient Name: Betty Mitchell  Date of evaluation: 7/21/2017  Referral Diagnosis: vertigo  Referring provider: Chelsy Argueta PA-C  Visit Diagnosis:     ICD-10-CM    1. Unilateral vestibular weakness, right H81.21    2. Dizziness R42    3. Unsteadiness on feet R26.81         Assessment:      Symptoms are consistent with weakened vestibular function. She was re-instructed on adaptation and substitution exercises. She was familiar with the exercises from her VRT in  and just needed a refresher. She will return in 2 weeks to progress HEP.    Goals:  Patient will be independent with home exercise program in: 2 weeks  Patient will be able to walk: on uneven/inclined surfaces;without loss of balance;in 12 weeks  Patient will bend: to dress;to clean;to do yard work;without dizziness;without loss of balance;in 12 weeks  Patient will turn head: without dizziness;for driving;for conversation;in 12 weeks    Patient's expectations/goals are realistic.    Barriers to Learning or Achieving Goals:  No Barriers.       Plan / Patient Instructions:        Plan of Care:   Authorization / Certification Start Date: 17  Authorization / Certification End Date: 10/19/17  Authorization / Certification Number of Visits: 12  Communication with: Referral Source  Patient Related Instruction: Nature of Condition;Treatment plan and rationale;Basis of treatment;Expected outcome  Neuromuscular Reeducation: vestibular  Canolith Repostioning:      Plan for next visit: Progress HEP     Subjective:         History of Present Illness:    Betty is a 66 y.o. female who presents to therapy today with complaints of dizziness and unsteadiness. Patient had sudden onset of symptoms about 6 weeks ago. Symptoms are intermittent. She has a history of similar symptoms in . She had a VNG at that time and it revealed a 30% right vestibular weakness. She had VRT here and symptoms completely resolved until 6 weeks ago. She reports that her symptoms feel different this time because the symptoms don't always occur with head motion.  Patient denies ear pain. Patient denies hearing changes.    Pain Ratin    Functional limitations are described as occurring with:   balance, bending, bed mobility, head turns, looking up  or down, stepping over curbs         Objective:      Note: Items left blank indicates the item was not performed or not indicated at the time of the evaluation.    Patient Outcome Measures: DHI 14      Vestibular Disorder Examination  1. Unilateral vestibular weakness, right     2. Dizziness     3. Unsteadiness on feet       Precautions/Restrictions: None  Posture Observation:      General standing posture is normal.    ROM:  Not Tested    Strength: Not Tested    Functional Mobility: good      Oculomotor Assessment: Normal. See ENT workup.     VOR Function: poor    Positional Tests:  Hallpike Right:  Negative  Hallpike Left:  Negative    Balance Assessment: Vestibular pattern on [place] SOP    Treatment Today:    X1 viewing-10 seconds-instructed  Targets-10 seconds-instructed  Normal surface eyes closed-instructed    TREATMENT MINUTES COMMENTS   Evaluation 20    Self-care/ Home management     Neuromuscular Re-education 10    Canalith repositioning procedure           Total 30    Blank areas are intentional and mean the treatment did not include these items.     OT Evaluation Code: (Please list factors)   Comorbidities: HTN, atrial fibrillation, cerebral aneurysm   Profile/History Review: Brief    Need for eval modification: No     # Treatment options: Limited    Clinical Decision Making:  Low      Occupational Profile/ Medical and Therapy History and Comorbidities Occupational Performance Clinical Decision Making   (Complexity)   brief history with review of medical/therapy records related to the presenting problem.  No comorbidities 1-3 Performance deficits that result in activity limitations and/or participation restrictions.    No Assessment Modification  Low complexity, which includes  problem-focused assessments, and consideration of a limited number of treatment options.      expanded review of medical/therapy records and additional review of physical, cognitive and psychosocial history.    May have comorbidities  3-5 Performance deficits that result in activity limitations and/or participation restrictions.    Minimal to moderate modification of assessment Moderate complexity, which includes analysis of data from detailed assessments, and consideration of several treatment options.         Review of medical/therapy records and extensive additional review of physical, cognitive and psychosocial history.  Comorbidities affect occupational performance 5 or more Performance deficits that result in activity limitations and/or participation restrictions.    Significant modification of assessment High complexity, analysis of  Occupational profile and data,  Comprehensive assessments, multiple treatment options.            Edda Posey  7/21/2017  7:55 AM

## 2021-06-13 NOTE — PROGRESS NOTES
Dear Dr. Amanda Martin, Cnp  [address] [place] [address]    Thank you for the opportunity to participate in the care of Ms. Betty Mitchell.    She is a 66 y.o. female who comes to the clinic for the review of her sleep study.  The patient was recently diagnosed with atrial fibrillation and was advised to undergo a direct sleep study as part of the workup.  On 08/31/17 the patient was found to have moderate obstructive sleep apnea with an apnea hypopnea index of 17.1 events per hour with the lowest O2 sat of 78%.  The patient states that prior to the sleep study she did not have any episodes of witnessed apnea or snoring.  In fact she denies any episodes of excessive daytime sleepiness.  The patient's review of system is otherwise unremarkable.     Ideal Sleep-Wake Cycle(devoid of societal pressure):    Patient would try to initiate sleep at around 10:30 PM with a sleep latency of 20 minutes. The patient would have one nocturnal awakening. Final wake up time is around 6:30 AM.      Past Medical History  Past Medical History:   Diagnosis Date     Aneurysm, cerebral      Arrhythmia      Cancer     right breast cancer      Hypertension      Persistent atrial fibrillation 3/14/2017    Dx 2016 Symptomatic [id number] score = 3 (age/ female/ HTN) Rx sotalol Rx warfarin Moderate KATHYA Sept 2017 PVI Sept 2017        Past Surgical History  Past Surgical History:   Procedure Laterality Date     ANKLE SURGERY      X 2     BREAST LUMPECTOMY       BREAST LUMPECTOMY Right 2012     CARDIAC ELECTROPHYSIOLOGY MAPPING AND ABLATION  9/12/17    PVI     EP ABLATION AFLUTTER  9/12/2017    Procedure: EP Ablation Atrial Flutter;  Surgeon: Trevin Silveira MD;  Location: [facility];  Service:      EP ABLATION PVI Left 9/12/2017    Procedure: EP Ablation PVI;  Surgeon: Trevin Silveira MD;  Location: [facility];  Service:      EYE SURGERY      X 2     TUBAL LIGATION          Meds  Current Outpatient  Prescriptions   Medication Sig Dispense Refill     aspirin 81 MG EC tablet Take 1 tablet (81 mg total) by mouth daily.  0     CALCIUM CARBONATE/VITAMIN D3 (CALCIUM 600 WITH VITAMIN D3 ORAL) Take 1 tablet by mouth daily.       diltiazem (CARDIZEM CD) 240 MG 24 hr capsule Take 1 capsule (240 mg total) by mouth daily. (Patient taking differently: Take 240 mg by mouth at bedtime. ) 90 capsule 3     DOCOSAHEXANOIC ACID/EPA (FISH OIL ORAL) Take 1,200 mg by mouth daily.       FOLIC ACID/MULTIVIT-MIN/LUTEIN (CENTRUM SILVER ORAL) Take 1 tablet by mouth daily.       lisinopril-hydrochlorothiazide (PRINZIDE,ZESTORETIC) 20-12.5 mg per tablet Take 1 tablet by mouth daily.  1     warfarin (COUMADIN) 5 MG tablet Take 5 mg by mouth daily. Take 1 tablet  by mouth daily Sunday, Tuesday,wed, Thursday, Saturday. Take 1/2 tab on Monday,Fridays Adjust dose based on INR results as directed.       No current facility-administered medications for this visit.         Allergies  Review of patient's allergies indicates no known allergies.     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     Social History Main Topics     Smoking status: Never Smoker     Smokeless tobacco: Never Used     Alcohol use 0.6 oz/week     1 Glasses of wine per week      Comment: <1/week     Drug use: No     Sexual activity: Not on file     Other Topics Concern     Not on file     Social History Narrative        Family History  Family History   Problem Relation Age of Onset     Acute Myocardial Infarction Father      Breast cancer Sister      Coronary Stenting Brother 67     Cerebral aneurysm Brother 67     Cerebral aneurysm Sister 58     No Medical Problems Daughter      COPD Sister      No Medical Problems Sister      No Medical Problems Sister      Coronary Stenting Brother      Diabetes Brother      Alcohol abuse  Brother         Review of Systems:  Constitutional: Negative except as noted in HPI.   Eyes: Negative except as noted in HPI.   ENT: Negative except as noted in HPI.   Cardiovascular: Negative except as noted in HPI.   Respiratory: Negative except as noted in HPI.   Gastrointestinal: Negative except as noted in HPI.   Genitourinary: Negative except as noted in HPI.   Musculoskeletal: Negative except as noted in HPI.   Integumentary: Negative except as noted in HPI.   Neurological: Negative except as noted in HPI.   Psychiatric: Negative except as noted in HPI.   Endocrine: Negative except as noted in HPI.   Hematologic/Lymphatic: Negative except as noted in HPI.      STOP BANG 10/6/2017   Do you snore loudly (louder than talking or loud enough to be heard through closed doors)? 0   Do you often feel tired, fatigued, or sleepy during daytime? 0   Has anyone observed you stop breathing in your sleep? 0   Do you have or are you being treated for high blood pressure? 1   BMI more than 35 kg/m2 1   Age over 50 years old? 1   Neck circumference greater than 16 inches? 0   Gender male? 0   Total Score 3   Epworths Sleepiness Scale 10/6/2017   Sitting and reading 0   Watching TV 0   Sitting, inactive in a public place (e.g. a theatre or a meeting) 0   As a passenger in a car for an hour without a break 0   Lying down to rest in the afternoon when circumstances permit 0   Sitting and talking to someone 0   Sitting quietly after a lunch without alcohol 0   In a car, while stopped for a few minutes in traffic 0   Total score 0   Rooming 10/6/2017   Usual bedtime 1030   Sleep Latency 20 mn   Awakenings 1   Wake Up Time 630   Energy Drinks 0   Coffee 0   Cola 1   Difficulty falling asleep No   Difficulty staying asleep No   Excessive daytime tiredness No   Excessive daytime sleepiness No   Dozing off while driving No   Shift Worker No   Sleep Walking? No   Sleep Talking? No   Kicking or punching? No   Restless legs symptoms No  "      Physical Exam:  /68  Pulse 70  Ht 5' 1\" (1.549 m)  Wt 213 lb 1.6 oz (96.7 kg)  SpO2 97%  BMI 40.26 kg/m2  BMI:Body mass index is 40.26 kg/(m^2).   GEN: NAD, obese  Head: Normocephalic.  EYES: PERRLA, EOMI  ENT: Oropharynx is clear, mallampatti class 4+ airway.   Nasal mucosa is moist without erythema  Neck : Thyroid is within normal limits. Neck circ 14\"  CV: Regular rate and rhythm, S1 & S2 positive.  LUNGS: Bilateral breathsounds heard.   ABDOMEN: Positive bowel sounds in all quadrants, soft, no rebound or guarding  MUSCULOSKELETAL: Bilateral trace leg swelling  SKIN: warm, dry, no rashes  Neurological: Alert, oriented to time, place, and person.  Psych: normal mood, normal affect     Labs/Studies:     Lab Results   Component Value Date    WBC 7.1 09/11/2017    HGB 14.2 09/11/2017    HCT 42.2 09/11/2017    MCV 87 09/11/2017     09/11/2017         Chemistry        Component Value Date/Time     09/11/2017 0928    K 3.8 09/11/2017 0928     09/11/2017 0928    CO2 23 09/11/2017 0928    BUN 16 09/11/2017 0928    CREATININE 0.81 09/11/2017 0928     09/11/2017 0928        Component Value Date/Time    CALCIUM 9.5 09/11/2017 0928    ALKPHOS 81 07/18/2017 1006    AST 40 07/18/2017 1006    ALT 42 07/18/2017 1006    BILITOT 0.4 07/18/2017 1006            No results found for: FERRITIN  Lab Results   Component Value Date    TSH 2.09 09/27/2016         Assessment and Plan:  In summary Betty Mitchell is a 66 y.o. year old female here for review of her sleep study.  1.  Obstructive sleep apnea   Ms. Betty Mitchell has moderate obstructive sleep apnea based on the results of sleep study. I educated the patient on the underlying pathophysiology of obstructive sleep apnea. We reviewed the risks associated with sleep apnea, including increased cardiovascular risk and overall death. We talked about treatments briefly.  The patient would like to think about her treatment options before " making a final decision.  If the patient decides to proceed with CPAP therapy she will call us and give me the permission to order the CPAP therapy.  The patient does not have a preference with regards to durable medical equipment company.  2.  Other sleep disturbance  3.  Obesity    Patient verbalized understanding of these issues, agrees with the plan and all questions were answered today. Patient was given an opportuntity to voice any other symptoms or concerns not listed above. Patient did not have any other symptoms or concerns.      Follow-up as needed.     Ge Ingram DO  Board Certified in Internal Medicine and Sleep Medicine  [facility].    (Note created with Dragon voice recognition and unintended spelling errors and word substitutions may occur)

## 2021-06-13 NOTE — PROGRESS NOTES
Pt was seen in clinic for post op PVI  with Dr. Silveira on 9-12-17.  Pt VSS, wt stable, LSC, denies SOB, heart rate regular, heart sounds S1 and S2 present, and palpable radial and pedal pulses.  No peripheral edema noted, no abdominal bloating or discomfort.  Pt denies difficulty swallowing and has no symptoms of heart burn.  Pt denies any neurological changes. Pt denies generalized or localized pain at this time, is tolerating advancement in activity well, staying well hydrated, and has a good appetite and eating well balanced meals.  Pt is having bowel movements and is voiding without difficulty.  Pt denies palpitations, irregularities in HR or rhythm, or sensations of a-fib.    Pt right groin has 1 puncture site, is C/D/I, no drainage, slight bruising noted around puncture site, 1 suture intact, groin is soft, and pt denies pain at the site.  Left groin has 2 puncture sites, is C/D/I, no drainage, slight amount of bruising around puncture site, has 2 sutures intact, groin is soft, and pt denies pain at the site.  Pt has strong femoral and pedal pulses present.  Pt has healing puncture site present at the left subclavian site, 1 suture intact, it is C/D/I, no drainage, small amount of bruising around site, and pt denies pain at the site.  Sutures were removed from both the left/right groin sites and left subclavian site without complication, and sites were left open to air.      Pt continues anticoagulation, Warfarin, daily.  Please see additional anticoagulation note for further details.    Reviewed post op PVI healing process, f/u apts, physical restrictions, nutritional requirements, when to contact EP RN/EP MD, and contact information was given to the pt for further concerns or questions.  Pt verbalized understanding.

## 2021-06-13 NOTE — PROGRESS NOTES
INR post PVI is 2.4 will continue on current dose of 2.5 mg M and F and 5 mg all other days. Retest in one week. After talking with pt and discussing history of greens/salads and medication change. Pt will  continue  with current diet and dosing of Warfarin.  Continue with moderation of Vit K and green leafy vegetables. Cautioned to call with increase bruising or bleeding. Reminded to call with medication change especially antibiotic. Call with any questions or concerns or any up coming procedures. Cautioned about using Herbal medication.

## 2021-06-13 NOTE — PROGRESS NOTES
INR 2.6. Pt is 4 weeks post PVI. She states she is feeling well and will return to PCP for further INR.

## 2021-06-13 NOTE — PATIENT INSTRUCTIONS - HE
Betty Mitchell,    It was a pleasure to see you today at the [facility].     My recommendations after this visit include:    Please call if any symptoms of recurrence of atrial fibrillation.    I discussed today that Eliquis will likely go generic within the next year.  You would be a good candidate to switch over if it is affordable for you.  I discussed that it will be a tier 3 medication.  Eliquis does not have any food or alcohol interactions.  You do not need INRs.  Your dose would be 5 mg 1 tablet orally every 12 hours with or without food.  I will be happy to switch you over from warfarin to Eliquis if that is needed.    To followup with me in 1 year.      My contact information:  Rolando Martin, EWELINA  After Hours or Scheduling  [phone]  My Nurse---Jayla Santa [phone]

## 2021-06-13 NOTE — PROGRESS NOTES
[facility] HEART [facility]   Arrhythmia Clinic    Assessment/Plan:  Diagnoses and all orders for this visit:    Labeled as persistent atrial fibrillation at time of PVI but had been paroxysmal with at least 50% burden prior to PVI.  I will defer to Dr. Silveira to decide whether he wants to classify her as paroxysmal or persistent.  Betty denies any complications post PVI and is done well.  She is not noticed any symptoms of A. fib since.  Before PVI she had learned to identify atrial fib as she felt A. fib and her chest and faster heart rates.  She has not had any symptoms of reoccurrence of A. fib since PVI.  She is going to [place] this next week and unable to do 2 weeks symptom monitor until November 20.  She is scheduled to see Dr. Silveira back on December 4.  To keep appointments.  I discussed details of the one patch monitor as well as to call if she has symptoms of recurrence of A. fib prior to starting monitor.  [id number] score of 3 and on warfarin.  Discussed that I am and not anticipating that Dr. Silveira will take her off of warfarin.  We had previously discussed the watchman device because she has a cerebral aneurysm.  Her insurance denied the watchman device and she has been considering changing insurances so she can proceed with this.  I told her there is a waiting period before she can get watchman but could have it within the next year.  She is a bit hesitant to move ahead right now.  Can discuss this with Dr. Silveira when she follows up with him in about 7 weeks.  -     One-Lead Patch Monitor; Future; Expected date: 10/24/17    Status post catheter ablation of atrial fibrillation and no complications post PVI and clearly significant decrease in A. fib burden.    Essential hypertension and well-controlled.    Paroxysmal atrial fibrillation  -     diltiazem (CARDIZEM CD) 240 MG 24 hr capsule; Take 1 capsule (240 mg total) by mouth at bedtime.  Dispense: 30 capsule; Refill: 6      Moderate KATHYA noted on sleep  study with AHI of 17.1.  She did not think she would tolerate mask so she never wore CPAP with the sleep study.  I am confused because Dr. Matos at his consult talked about sending a prescription through for CPAP.  It is currently not covered under her current insurance but Betty thinks she will do it when she has new insurance starting in January of next year.  She reports that Dr. Matos discussed dental appliance but I do not see that in his consult note.  I discussed the dental appliances usually not covered by insurance at all.  She does not think that will likely be affordable then.  I discussed importance of addressing KATHYA otherwise she has much higher rate of reoccurrence of atrial fib post ablation.  40 minutes were spent in face-to-face counseling regarding above diagnoses and options for treatment.    ______________________________________________________________________    Subjective:    I had the opportunity to see Betty Mitchell at the [facility]. Betty Mitchell is a 66 y.o. female and here for follow-up after having PVI with Dr. Silveira on 9/12/2017.    Betty Mitchell has a known history of paroxysmal atrial fib which was diagnosed in September 2016.  She had a pretty rapid progression of her atrial fib and I think prior to PVI she was spending at least half the time out of rhythm.  She was seen frequently in atrial fib and unable to complete coronary CTA for instance because of A. fib with RVR.  Dr. Silveira appears to have changed her diagnosis to persistent atrial fib at time of PVI so I will leave it at this.  She has never had a cardioversion.  She has never had a good trial of antiarrhythmics.  She has done well post ablation and tells me that she has not had any complications.  She is doing more physical activity than she was able to do prior to ablation.  She reports that she has improved tolerance of activity.  For instance, she can now cut the lawn in 1 session without  having to stop multiple times like she did before ablation.  She feels so much better already.  She has had no symptoms of recurrence of A. fib and believes that she had learned to recognize afib before she had PVI.  Betty tells me that she was quite impressed with meeting Dr. Silveira at time of PVI and that worked well for her.    ______________________________________________________________________    Problem List:  Patient Active Problem List   Diagnosis     Persistent atrial fibrillation     Essential hypertension     Cerebral aneurysm     BERUMEN (dyspnea on exertion)     Obstructive sleep apnea     Status post catheter ablation of atrial fibrillation     Medical History:  Past Medical History:   Diagnosis Date     Aneurysm, cerebral      Arrhythmia      Cancer     right breast cancer      Hypertension      Persistent atrial fibrillation 3/14/2017    Dx 2016 Symptomatic [id number] score = 3 (age/ female/ HTN) Rx sotalol Rx warfarin Moderate KATHYA Sept 2017 PVI Sept 2017     Surgical History:  Past Surgical History:   Procedure Laterality Date     ANKLE SURGERY      X 2     BREAST LUMPECTOMY       BREAST LUMPECTOMY Right 2012     CARDIAC ELECTROPHYSIOLOGY MAPPING AND ABLATION  9/12/17    PVI     EP ABLATION AFLUTTER  9/12/2017    Procedure: EP Ablation Atrial Flutter;  Surgeon: Trevin Silveira MD;  Location: [facility];  Service:      EP ABLATION PVI Left 9/12/2017    PVI Sept 12, 2017---cryo to 6 PVs and RCTI with SWA     EYE SURGERY      X 2     TUBAL LIGATION       Social History:  Social History   Substance Use Topics     Smoking status: Never Smoker     Smokeless tobacco: Never Used     Alcohol use 0.6 oz/week     1 Glasses of wine per week      Comment: <1/week        Review of Systems: Review of Systems:   General: WNL  Eyes: WNL  Ears/Nose/Throat: WNL  Lungs: WNL  Heart: WNL  Stomach: WNL  Bladder: WNL  Muscle/Joints: WNL  Skin: WNL  Nervous System: WNL  Mental Health: WNL     Blood: WNL      Family  "History:  Family History   Problem Relation Age of Onset     Acute Myocardial Infarction Father      Breast cancer Sister      Coronary Stenting Brother 67     Cerebral aneurysm Brother 67     Cerebral aneurysm Sister 58     No Medical Problems Daughter      COPD Sister      No Medical Problems Sister      No Medical Problems Sister      Coronary Stenting Brother      Diabetes Brother      Alcohol abuse Brother          Allergies:  No Known Allergies  Medications:  Current Outpatient Prescriptions   Medication Sig Dispense Refill     CALCIUM CARBONATE/VITAMIN D3 (CALCIUM 600 WITH VITAMIN D3 ORAL) Take 1 tablet by mouth daily.       diltiazem (CARDIZEM CD) 240 MG 24 hr capsule Take 1 capsule (240 mg total) by mouth at bedtime. 30 capsule 6     DOCOSAHEXANOIC ACID/EPA (FISH OIL ORAL) Take 1,200 mg by mouth daily.       FOLIC ACID/MULTIVIT-MIN/LUTEIN (CENTRUM SILVER ORAL) Take 1 tablet by mouth daily.       lisinopril-hydrochlorothiazide (PRINZIDE,ZESTORETIC) 20-12.5 mg per tablet Take 1 tablet by mouth daily.  1     warfarin (COUMADIN) 5 MG tablet Take 5 mg by mouth daily. Take 1 tablet  by mouth daily Sunday, Tuesday,wed, Thursday, Saturday. Take 1/2 tab on Monday,Fridays Adjust dose based on INR results as directed.       No current facility-administered medications for this visit.        Objective:   Vital signs:  /68 (Patient Site: Left Arm, Patient Position: Sitting, Cuff Size: Adult Large)  Pulse 74  Resp 18  Ht 5' 1\" (1.549 m)  Wt 212 lb (96.2 kg)  BMI 40.06 kg/m2      Physical Exam:    GENERAL APPEARANCE: Alert, cooperative and in no acute distress.  HEENT: No scleral icterus. No Xanthelasma. Oral mucuos membranes pink and moist.  NECK: JVP Nl.   CHEST: clear to auscultation  CARDIOVASCULAR: S1, S2 without murmur ,clicks or rubs. Regular, regular.  Radial and posterior tibial pulses are intact and symetric.   EXTREMITIES: No cyanosis, clubbing or edema.    Results personally reviewed:  Results for " orders placed during the hospital encounter of 09/11/17   Echo ARACELI W Bubble [ECH12] 09/11/2017    Narrative 1. Normal left ventricular size and systolic performance with a visually   estimated ejection fraction of 55-60%.   2. There is trace aortic insufficiency.   3. Normal right ventricular size and systolic performance.   4. There is mild left atrial enlargement.  5. No thrombus is detected within the left atrium/left atrial appendage.  6. All 4 pulmonary veins are identified and appear to be in normal   anatomic location.  7. Echo contrast examination was performed using agitated NS as contrast   agent. The right heart opacity was adequate and the left heart was   adequately visualized. There was no evidence of right to left shunt during   spontaneous respiration or following release of Valsalva.     The study was performed using a multiplane adult transducer. 2-D,   colorflow Doppler, and spectral Doppler analysis was performed. Informed   consent was obtained prior to the procedure.  Sedation: fentanyl 100 mcg &   versed 1.0 mg iv. Topical anesthesia was performed with viscous lidocaine   and benzocaine spray. The transducer was introduced without difficulty.   There were no complications of the procedure.      Results for orders placed during the hospital encounter of 11/14/16   NM Pharmacologic Stress Test      Narrative         FINDINGS: FINDINGS: The Lexiscan stress and rest images demonstrate normal left ventricular size with significant breat attenuation artifact without   reversible perfusion abnormalities. The gated images reveal mild global   hypokinesis with measured left ventricular ejection fraction is 42%.       CONCLUSION:    1. Lexiscan stress nuclear study is negative for clear evidence of   inducible myocardial ischemia.    2. There is significant breast attenuation artifact in current study.   Would consider alternative testing if high clinical suppression for for   obstructive coronary  arteries disease given current study has decrease   sensitive for detecting anterior wall ischemia.  3. LVEF: 42% with global hypokinesis.        October 2016 24-hour Holter shows atrial fib throughout with ventricular response range 52-1 6 7. Average ventricular response 95. Tendency towards rapid rates with activity.   October 2016 echo shows EF 35-40% and global hypokinesis.       August 2017 24-hour Holter shows:  Impression:    Abnormal Holter monitor tracing with evidence of sustained atrial fibrillation with mildly elevated ventricular response.    The patient's symptoms do appear to correlate with atrial fibrillation.    No significant ventricular arrhythmia.    No profound bradycardia or pauses.      July 2017 CT calcium score of 53 with moderate to high risk of CAD.    Unable to do CTA due to A. fib with RVR.   9/12/2017 PVI with cryoablation to 6 pulmonary veins plus right CTI.  September 2017 CT of pulmonary vein shows right middle pulmonary vein has 3 branches and otherwise normal anatomy.     Results for orders placed or performed in visit on 08/30/17   ECG 12 lead with MUSE   Result Value Ref Range    SYSTOLIC BLOOD PRESSURE  mmHg    DIASTOLIC BLOOD PRESSURE  mmHg    VENTRICULAR RATE 98 BPM    ATRIAL RATE 102 BPM    P-R INTERVAL  ms    QRS DURATION 90 ms    Q-T INTERVAL 344 ms    QTC CALCULATION (BEZET) 439 ms    P Axis  degrees    R AXIS -10 degrees    T AXIS 14 degrees    MUSE DIAGNOSIS       Atrial fibrillation  Nonspecific ST abnormality , probably digitalis effect  Abnormal ECG  When compared with ECG of 05-DEC-2016 10:39,  Atrial fibrillation has replaced Sinus rhythm  Vent. rate has increased BY  46 BPM  ST now depressed in Inferior leads  Confirmed by JAYDE HUTCHINSON, KRUPA LOC:SJ ([id number]) on 8/30/2017 4:21:39 PM         TSH:   Lab Results   Component Value Date    TSH 2.09 09/27/2016     BNP:   Lab Results   Component Value Date    BNP 15 12/06/2013     BMP:  Lab Results   Component Value Date     CREATININE 0.81 09/11/2017    BUN 16 09/11/2017     09/11/2017    K 3.8 09/11/2017     09/11/2017    CO2 23 09/11/2017       This note has been dictated using voice recognition software. Any grammatical or context distortions are unintentional and inherent to the software.    CHANG PEREZ RN, [facility] HEART [facility]  [phone]

## 2021-06-13 NOTE — PROGRESS NOTES
Pt is s/p PVI with Dr. Silveira on 9-12-17.  Pt will require weekly INR's for 4 weeks, she normally follows with her PMD at [facility] clinic.  Pt to continue current dose and will f/u with the [facility] clinic next week and for 4 more weeks regarding INR.

## 2021-06-13 NOTE — PROGRESS NOTES
INR 2.9. Post PVI. Continue current management dosing of Warfarin. Continue  diet of moderate Vitamin K intake. Discussed with pt the need to call with questions or concerns or any change in medication especially herbal medication or OTC. Call with increased bleeding or bruising or any upcoming procedures.

## 2021-06-13 NOTE — PROGRESS NOTES
CCTA complete, tolerated without complaint, post procedure instructions given, verbalized understanding.

## 2021-06-13 NOTE — PROGRESS NOTES
INR 2.7 post PVI. Will continue on current dose at 2.5 mg M and F and 5 mg all other days. Continue current management dosing of Warfarin. Continue  diet of moderate Vitamin K intake. Discussed with pt the need to call with questions or concerns or any change in medication especially herbal medication or OTC. Call with increased bleeding or bruising or any upcoming procedures.

## 2021-06-14 NOTE — PROGRESS NOTES
[facility] HEART [facility]  Arrhythmia Clinic  Trevin Silveira    Referring:      Assessment:         Persistent atrial fibrillation: The patient is 3 months status post ablation of her persistent atrial fibrillation.  The patient is had no recurrent symptoms suggesting atrial fibrillation or flutter.  She has had no ECG documented atrial fibrillation or flutter however her multi-day patient activated monitor data is not yet been reviewed.  The patient is off membrane active antiarrhythmic drug therapy.  The patient does have an elevated [id number] score of 3 and remains on oral anticoagulant as per current recommendations.  The patient's has bled risk score is elevated.  The patient does have a contraindication to chronic oral anticoagulant therapy in that she has a known cerebral aneurysm.  The patient's current insurance company is inappropriately denied her the ability to obtain a left atrial appendage occlusion device.  The patient is a good candidate for this therapy which would provide her stroke risk reduction with the common common at risk of decreased risk of medication associated bleeding complications.    Obstructive sleep apnea: The patient has not met again with the sleep clinic regarding potential therapies for her sleep apnea.  The patient is quite concerned about anything that touches her face and therefore is concerned about her ability to tolerate CPAP therapy.  Strongly encourage the patient to follow-up with Dr. Ingram in the sleep clinic to discuss all options including possible oral appliance in an effort to treat her sleep apnea.    Hypertension: The patient's blood pressure is at target today on her current medical therapy.      Recommendations:    No change in current medications.    The patient is considering a alternative health insurance product and will contact Judi Herrera RN regarding physician/hospital coverage.    Follow-up in the A. fib clinic with the EP nurse  practitioner in 3 months.  I would anticipate seeing the patient back around the anniversary of her procedure.      Patient Active Problem List   Diagnosis     Persistent atrial fibrillation     Essential hypertension     Cerebral aneurysm     BERUMEN (dyspnea on exertion)     Obstructive sleep apnea     Status post catheter ablation of atrial fibrillation       Subjective:  Betty Mitchell (67 y.o. female) returns to the arrhythmia clinic for follow-up of her persistent atrial fibrillation post ablation.  The patient notes that she recovered uneventfully.  She had no urgent or emergent hospital and/or office visits.  The patient traveled to [place] and was able to participate in all activities with no symptoms of atrial fibrillation which previously were dyspnea on exertion and fatigue.  She continues to do well from that regard.  She has not yet followed up with the sleep clinic regarding potential treatment options of her moderate obstructive sleep apnea.  The patient reports some minor bruising and bleeding but no GI or  bleeding.  She has had no neurologic events.  She reports no orthopnea, PND, or ankle edema.    Current Outpatient Prescriptions   Medication Sig Dispense Refill     CALCIUM CARBONATE/VITAMIN D3 (CALCIUM 600 WITH VITAMIN D3 ORAL) Take 1 tablet by mouth daily.       diltiazem (CARDIZEM CD) 240 MG 24 hr capsule Take 1 capsule (240 mg total) by mouth at bedtime. 30 capsule 6     DOCOSAHEXANOIC ACID/EPA (FISH OIL ORAL) Take 1,200 mg by mouth daily.       FOLIC ACID/MULTIVIT-MIN/LUTEIN (CENTRUM SILVER ORAL) Take 1 tablet by mouth daily.       lisinopril-hydrochlorothiazide (PRINZIDE,ZESTORETIC) 20-12.5 mg per tablet Take 1 tablet by mouth daily.  1     warfarin (COUMADIN) 5 MG tablet Take 5 mg by mouth daily. Take 1 tablet  by mouth daily Sunday, Tuesday,wed, Thursday, Saturday. Take 1/2 tab on Monday,Fridays Adjust dose based on INR results as directed.       No current facility-administered  "medications for this visit.        Review of Systems:   General: WNL  Eyes: WNL  Ears/Nose/Throat: WNL  Lungs: WNL  Heart: WNL  Stomach: WNL  Bladder: WNL  Muscle/Joints: WNL  Skin: WNL  Nervous System: WNL  Mental Health: WNL     Blood: WNL    Family History  Family History   Problem Relation Age of Onset     Acute Myocardial Infarction Father      Breast cancer Sister      Coronary Stenting Brother 67     Cerebral aneurysm Brother 67     Cerebral aneurysm Sister 58     No Medical Problems Daughter      COPD Sister      No Medical Problems Sister      No Medical Problems Sister      Coronary Stenting Brother      Diabetes Brother      Alcohol abuse Brother        Social History   reports that she has never smoked. She has never used smokeless tobacco. She reports that she drinks about 0.6 oz of alcohol per week  She reports that she does not use illicit drugs.    Objective:   Vital signs in last 24 hours:  /72 (Patient Site: Right Arm, Patient Position: Sitting, Cuff Size: Adult Large)  Pulse 84  Resp 18  Ht 5' 1\" (1.549 m)  Wt 210 lb (95.3 kg)  BMI 39.68 kg/m2  Weight: Weight: 210 lb (95.3 kg)     Physical Exam:  General: The patient is alert oriented to person place and situation.  The patient is in no acute distress at the time of my evaluation.  Eyes: Pupils are equal, round, and reactive to light.  Conjunctiva and sclera are clear.  ENT: Oral mucosa is moist and without redness. No evident nasal discharge.  Pulmonary: Lungs are clear bilaterally with no rales, rhonchi, or wheezes.    Cardiovascular exam: Rhythm is regular. S1 and S2 are normal. No significant murmur is present. JVP is normal. Lower extremities demonstrate no significant edema. Distal pulses are intact bilaterally.  Abdomen is obese, soft, and nontender.  Musculoskeletal: Spine is straight. Extremities without deformity.  Neuro: Gait is normal.   Skin is warm, dry, and otherwise intact.      Cardiographics:   Multi-day patient " activated monitor result is pending at the time of this dictation.    Lab Results:   Lab Results   Component Value Date     09/11/2017    K 3.8 09/11/2017     09/11/2017    CO2 23 09/11/2017    BUN 16 09/11/2017    CREATININE 0.81 09/11/2017    CALCIUM 9.5 09/11/2017     Lab Results   Component Value Date    WBC 7.1 09/11/2017    HGB 14.2 09/11/2017    HCT 42.2 09/11/2017    MCV 87 09/11/2017     09/11/2017     Lab Results   Component Value Date    INR 2.6 10/12/2017    INR 2.70 (!) 09/15/2017         Outside record review:

## 2021-06-15 PROBLEM — R06.09 DOE (DYSPNEA ON EXERTION): Status: ACTIVE | Noted: 2017-03-14

## 2021-06-15 PROBLEM — I10 ESSENTIAL HYPERTENSION: Status: ACTIVE | Noted: 2017-03-14

## 2021-06-15 PROBLEM — I67.1 CEREBRAL ANEURYSM: Status: ACTIVE | Noted: 2017-03-14

## 2021-06-15 PROBLEM — I48.19 PERSISTENT ATRIAL FIBRILLATION (H): Status: ACTIVE | Noted: 2017-03-14

## 2021-06-16 PROBLEM — G47.33 OSA ON CPAP: Status: ACTIVE | Noted: 2017-09-11

## 2021-06-16 PROBLEM — E66.01 MORBID OBESITY (H): Status: ACTIVE | Noted: 2020-12-09

## 2021-06-16 PROBLEM — Z98.890 STATUS POST CATHETER ABLATION OF ATRIAL FIBRILLATION: Status: ACTIVE | Noted: 2017-09-12

## 2021-06-16 NOTE — PROGRESS NOTES
[facility]   Arrhythmia Clinic    Assessment/Plan:  Persistent atrial fib documented by Dr. Silveira but more likely paroxysmal atrial fib but had frequent episodes and spending about 50% of the time out of rhythm early on after diagnosis.  Betty has had no symptoms or EKG evidence of reoccurrence since PVI.  December 2017 symptom monitor was negative for any silent atrial fibrillation.  She is pleased with her results so far post PVI.    [id number] score of 3 and discussed with Betty that I think Dr. Silveira recommends long-term anticoagulation for her but to discuss this with him on follow-up.  She is hoping to get off of anticoagulation,  but having no problems on warfarin and does not want to incur risk of stroke.  Thus, encouraged to stay on warfarin.  To call if any symptoms of reoccurrence of atrial fib.  To follow-up with Dr. Silveira in September 2018 which will be 1 year post PVI.    Essential hypertension well controlled.  Moderate obstructive sleep apnea documented on sleep study and untreated.  Patient tells me that she cannot stand have anything on her face since so she knows she would not tolerate CPAP.  She currently has insurance and is resistant to even a trial of CPAP.  She tells me that Dr. Matos offered her the option for a mouthpiece.  Recommended that she contact him as important to treat obstructive sleep apnea in order to prevent reoccurrence of atrial fib post PVI.  ______________________________________________________________________    Subjective:    I had the opportunity to see Betty Mitchell at the [facility]. Betty Mitchell is a 67 y.o. female and here for routine EP follow-up.    Betty Mitchell has a known history of paroxysmal atrial fib which was diagnosed in September 2016.  She had a pretty rapid progression of her atrial fib and I think prior to PVI she was spending at least half the time out of rhythm.  She was seen frequently in atrial fib and  unable to complete coronary CTA for instance because of A. fib with RVR.  Betty has never had a cardioversion.  She tells me that she has felt much better since PVI and more active.  She hurt her back of day ago but has had this in the past and hopefully will get better.  She tells me she is active and denies any cardiac symptoms on questioning.  She has monthly INRs and sees physician then who checks her rhythm.  She also does periodic pulse checks and tells me every time she  checks she is in normal rhythm since PVI.  ______________________________________________________________________    Problem List:  Patient Active Problem List   Diagnosis     Persistent atrial fibrillation     Essential hypertension     Cerebral aneurysm     BERUMEN (dyspnea on exertion)     Obstructive sleep apnea     Status post catheter ablation of atrial fibrillation     Medical History:  Past Medical History:   Diagnosis Date     Aneurysm, cerebral      Arrhythmia      Cancer     right breast cancer      Hypertension      Persistent atrial fibrillation 3/14/2017    Dx 2016 Symptomatic [id number] score = 3 (age/ female/ HTN) Rx sotalol Rx warfarin Moderate KATHYA Sept 2017 PVI Sept 2017     Status post catheter ablation of atrial fibrillation 9/12/2017    PVI Sept 12, 2017 (cryo-PVI  + RA-CTI line)     Surgical History:  Past Surgical History:   Procedure Laterality Date     ANKLE SURGERY      X 2     BREAST LUMPECTOMY       BREAST LUMPECTOMY Right 2012     CARDIAC ELECTROPHYSIOLOGY MAPPING AND ABLATION  9/12/17    PVI     EP ABLATION AFLUTTER  9/12/2017    Procedure: EP Ablation Atrial Flutter;  Surgeon: Trevin Silveira MD;  Location: [facility] Cath Lab;  Service:      EP ABLATION PVI Left 9/12/2017    PVI Sept 12, 2017---cryo to 6 PVs and RCTI with SWA     EYE SURGERY      X 2     TUBAL LIGATION       Social History:  Social History   Substance Use Topics     Smoking status: Never Smoker     Smokeless tobacco: Never Used     Alcohol use  "0.6 oz/week     1 Glasses of wine per week      Comment: <1/week        Review of Systems: Review of Systems:   General: WNL  Eyes: WNL  Ears/Nose/Throat: WNL  Lungs: WNL  Heart: WNL  Stomach: WNL  Bladder: WNL  Muscle/Joints: WNL  Skin: WNL  Nervous System: WNL  Mental Health: WNL     Blood: WNL      Family History:  Family History   Problem Relation Age of Onset     Acute Myocardial Infarction Father      Breast cancer Sister      Coronary Stenting Brother 67     Cerebral aneurysm Brother 67     Cerebral aneurysm Sister 58     No Medical Problems Daughter      COPD Sister      No Medical Problems Sister      No Medical Problems Sister      Coronary Stenting Brother      Diabetes Brother      Alcohol abuse Brother          Allergies:  No Known Allergies  Medications:  Current Outpatient Prescriptions   Medication Sig Dispense Refill     CALCIUM CARBONATE/VITAMIN D3 (CALCIUM 600 WITH VITAMIN D3 ORAL) Take 1 tablet by mouth daily.       diltiazem (CARDIZEM CD) 240 MG 24 hr capsule Take 1 capsule (240 mg total) by mouth at bedtime. 30 capsule 6     DOCOSAHEXANOIC ACID/EPA (FISH OIL ORAL) Take 1,200 mg by mouth daily.       FOLIC ACID/MULTIVIT-MIN/LUTEIN (CENTRUM SILVER ORAL) Take 1 tablet by mouth daily.       lisinopril-hydrochlorothiazide (PRINZIDE,ZESTORETIC) 20-12.5 mg per tablet Take 1 tablet by mouth daily.  1     warfarin (COUMADIN) 5 MG tablet Take 5 mg by mouth daily. Take 1 tablet  by mouth daily Sunday, Tuesday,wed, Thursday, Saturday. Take 1/2 tab on Monday,Fridays Adjust dose based on INR results as directed.       No current facility-administered medications for this visit.        Objective:   Vital signs:  /72 (Patient Site: Left Arm, Patient Position: Sitting, Cuff Size: Adult Regular)  Pulse 74  Resp 16  Ht 5' 1\" (1.549 m)  Wt 214 lb (97.1 kg) Comment: with shoes  BMI 40.43 kg/m2      Physical Exam:    GENERAL APPEARANCE: Alert, cooperative and in no acute distress.  HEENT: No scleral " icterus. No Xanthelasma. Oral mucuos membranes pink and moist.  NECK: JVP Nl.   CHEST: clear to auscultation  CARDIOVASCULAR: S1, S2 without murmur ,clicks or rubs. Regular, regular.  Radial and posterior tibial pulses are intact and symetric.   EXTREMITIES: No cyanosis, clubbing or edema.    Results personally reviewed:  Results for orders placed during the hospital encounter of 09/11/17   Echo ARACELI W Bubble [ECH12] 09/11/2017    Narrative 1. Normal left ventricular size and systolic performance with a visually   estimated ejection fraction of 55-60%.   2. There is trace aortic insufficiency.   3. Normal right ventricular size and systolic performance.   4. There is mild left atrial enlargement.  5. No thrombus is detected within the left atrium/left atrial appendage.  6. All 4 pulmonary veins are identified and appear to be in normal   anatomic location.  7. Echo contrast examination was performed using agitated NS as contrast   agent. The right heart opacity was adequate and the left heart was   adequately visualized. There was no evidence of right to left shunt during   spontaneous respiration or following release of Valsalva.     The study was performed using a multiplane adult transducer. 2-D,   colorflow Doppler, and spectral Doppler analysis was performed. Informed   consent was obtained prior to the procedure.  Sedation: fentanyl 100 mcg &   versed 1.0 mg iv. Topical anesthesia was performed with viscous lidocaine   and benzocaine spray. The transducer was introduced without difficulty.   There were no complications of the procedure.      Results for orders placed during the hospital encounter of 11/14/16   NM Pharmacologic Stress Test      Narrative         FINDINGS: FINDINGS: The Lexiscan stress and rest images demonstrate normal left ventricular size with significant breat attenuation artifact without   reversible perfusion abnormalities. The gated images reveal mild global   hypokinesis with measured  left ventricular ejection fraction is 42%.       CONCLUSION:    1. Lexiscan stress nuclear study is negative for clear evidence of   inducible myocardial ischemia.    2. There is significant breast attenuation artifact in current study.   Would consider alternative testing if high clinical suppression for for   obstructive coronary arteries disease given current study has decrease   sensitive for detecting anterior wall ischemia.  3. LVEF: 42% with global hypokinesis.        October 2016 24-hour Holter shows atrial fib throughout with ventricular response range 52-1 6 7. Average ventricular response 95. Tendency towards rapid rates with activity.   October 2016 echo shows EF 35-40% and global hypokinesis.       August 2017 24-hour Holter shows:  Impression:    Abnormal Holter monitor tracing with evidence of sustained atrial fibrillation with mildly elevated ventricular response.    The patient's symptoms do appear to correlate with atrial fibrillation.    No significant ventricular arrhythmia.    No profound bradycardia or pauses.      July 2017 CT calcium score of 53 with moderate to high risk of CAD.    Unable to do CTA due to A. fib with RVR.   9/12/2017 PVI with cryoablation to 6 pulmonary veins plus right CTI.  September 2017 CT of pulmonary vein shows right middle pulmonary vein has 3 branches and otherwise normal anatomy.    December 2017 2 weeks symptom monitor negative for any atrial fibrillation.    Results for orders placed or performed in visit on 08/30/17   ECG 12 lead with MUSE   Result Value Ref Range    SYSTOLIC BLOOD PRESSURE  mmHg    DIASTOLIC BLOOD PRESSURE  mmHg    VENTRICULAR RATE 98 BPM    ATRIAL RATE 102 BPM    P-R INTERVAL  ms    QRS DURATION 90 ms    Q-T INTERVAL 344 ms    QTC CALCULATION (BEZET) 439 ms    P Axis  degrees    R AXIS -10 degrees    T AXIS 14 degrees    MUSE DIAGNOSIS       Atrial fibrillation  Nonspecific ST abnormality , probably digitalis effect  Abnormal ECG  When compared  with ECG of 05-DEC-2016 10:39,  Atrial fibrillation has replaced Sinus rhythm  Vent. rate has increased BY  46 BPM  ST now depressed in Inferior leads  Confirmed by KRUPA DONOHUE MD LOC: ([id number]) on 8/30/2017 4:21:39 PM         TSH:   Lab Results   Component Value Date    TSH 2.09 09/27/2016     BNP:   Lab Results   Component Value Date    BNP 15 12/06/2013     BMP:  Lab Results   Component Value Date    CREATININE 0.81 09/11/2017    BUN 16 09/11/2017     09/11/2017    K 3.8 09/11/2017     09/11/2017    CO2 23 09/11/2017       This note has been dictated using voice recognition software. Any grammatical or context distortions are unintentional and inherent to the software.    CHANG PEREZ RN, [facility] HEART [facility]  [phone]

## 2021-06-17 NOTE — PATIENT INSTRUCTIONS - HE
"Patient Instructions by Ge Ingram DO at 3/22/2019  8:00 AM     Author: Ge Ingram DO Service: -- Author Type: Physician    Filed: 3/22/2019  8:23 AM Encounter Date: 3/22/2019 Status: Signed    : Ge Ingram DO (Physician)         SLEEP HYGIENE    Sleep only as much as you need to feel rested and then get out of bed   Keep a regular sleep schedule   Avoid forcing sleep   Exercise regularly for at least 20 minutes, preferably 4 to 5 hours before bedtime   Avoid caffeinated beverages after lunch   Avoid alcohol near bedtime: no \"night cap\"   Avoid smoking, especially in the evening   Do not go to bed hungry   Adjust bedroom environment   Avoid prolonged use of light-emitting screens before bedtime    Deal with your worries before bedtime              "

## 2021-06-20 NOTE — PROGRESS NOTES
[facility]  Arrhythmia Clinic  Trevin Silveira    Referring:      Assessment:         Persistent atrial fibrillation: The patient is 1 year out from her ablation procedure to treat her atrial fibrillation.  She has had no symptomatic recurrence of sustained atrial fibrillation or flutter.  She has had no ECG documented recurrence of atrial fibrillation or flutter.  The patient is off membrane active antiarrhythmic drug therapy.  She remains on oral anticoagulant therapy due to elevated [id number] score.  The patient has a known cerebral aneurysm as well as easy bruising.  She has indications to come off oral anticoagulant therapy and would be a candidate for left atrial appendage closure.  The underlying principal and description of the procedure and its risks were discussed and the patient in detail.  She will consider this potential option.    Hypertension: The patient's blood pressure is at target today on her current medication regimen.    Obstructive sleep apnea: The patient reports that she underwent follow-up testing after a trial of an oral appliance which it appears was unsuccessful at relieving her obstruction.  In discussing this with the patient she is amenable to reaching back out to the sleep clinic for consideration of CPAP therapy.  She would like to discuss potential options including whether not she would be a candidate for nasal pillows.  I strongly encouraged her to phone into the sleep clinic to have these conversations and determine next steps.      Recommendations:    No change in current medications.    The patient will be contacted in the next 1-2 weeks to determine whether or not she is interested in moving forward with potential left atrial appendage closure.  We should be able to evaluate her pre-PVI ARACELI to determine whether or not her appendage is suitable for current devices.  She is a potential candidate for the Amulet trial which was discussed with her  briefly.    Follow-up in the A. fib clinic with the EP nurse practitioner in 1 year.      Patient Active Problem List   Diagnosis     Persistent atrial fibrillation (H)     Essential hypertension     Cerebral aneurysm     BERUMEN (dyspnea on exertion)     Obstructive sleep apnea     Status post catheter ablation of atrial fibrillation       Subjective:  Betty Mitchell (67 y.o. female) returns to the arrhythmia clinic for interval reevaluation of her arrhythmia status post ablation.  The patient reports no recurrence of any palpitations suggesting return of atrial fibrillation or flutter.  She has been active with with mild dyspnea on exertion which is chronic and no exertional chest pain or pressure.  She reports no orthopnea, PND, or ankle edema.  The patient states that she underwent follow-up testing to assess the efficacy of her oral appliance to treat her obstructive sleep apnea.  Apparently the device was not effectively treating her KATHYA based on the patient's report.  She has initiated contact with the sleep clinic here at [facility] to try and determine if she can start CPAP therapy to treat her KATHYA.    Current Outpatient Prescriptions   Medication Sig Dispense Refill     CALCIUM CARBONATE/VITAMIN D3 (CALCIUM 600 WITH VITAMIN D3 ORAL) Take 1 tablet by mouth daily.       diltiazem (CARDIZEM CD) 240 MG 24 hr capsule Take 1 capsule (240 mg total) by mouth at bedtime. 30 capsule 6     DOCOSAHEXANOIC ACID/EPA (FISH OIL ORAL) Take 1,200 mg by mouth daily.       FOLIC ACID/MULTIVIT-MIN/LUTEIN (CENTRUM SILVER ORAL) Take 1 tablet by mouth daily.       lisinopril-hydrochlorothiazide (PRINZIDE,ZESTORETIC) 20-12.5 mg per tablet Take 1 tablet by mouth daily.  1     warfarin (COUMADIN) 5 MG tablet Take 5 mg by mouth daily. Take 1 tablet  by mouth daily Sunday, Tuesday,wed, Thursday, Saturday. Take 1/2 tab on Monday,Fridays Adjust dose based on INR results as directed.       No current facility-administered medications for  "this visit.        Review of Systems:   General: WNL  Eyes: WNL  Ears/Nose/Throat: WNL  Lungs: WNL  Heart: Leg Swelling  Stomach: WNL  Bladder: WNL  Muscle/Joints: WNL  Skin: WNL  Nervous System: WNL  Mental Health: WNL     Blood: WNL    Family History  Family History   Problem Relation Age of Onset     Acute Myocardial Infarction Father      Breast cancer Sister      Diabetes type II Brother      Cerebral aneurysm Brother 67     Cerebral aneurysm Sister 58     No Medical Problems Daughter      COPD Sister      No Medical Problems Sister      No Medical Problems Sister      Coronary Stenting Brother      Heart attack Brother 74     Hypertension Brother      Alcohol abuse Brother        Social History   reports that she has never smoked. She has never used smokeless tobacco. She reports that she drinks about 0.6 oz of alcohol per week  She reports that she does not use illicit drugs.    Objective:   Vital signs in last 24 hours:  /70 (Patient Site: Left Arm, Patient Position: Sitting, Cuff Size: Adult Large)  Pulse 60  Resp 12  Ht 5' 1\" (1.549 m)  Wt 218 lb 8 oz (99.1 kg)  BMI 41.29 kg/m2  Weight: Weight: 218 lb 8 oz (99.1 kg)     Physical Exam:  General: The patient is alert oriented to person place and situation.  The patient is in no acute distress at the time of my evaluation.  Eyes: Pupils are equal, round, and reactive to light.  Conjunctiva and sclera are clear.  ENT: Oral mucosa is moist and without redness. No evident nasal discharge.  Pulmonary: Lungs are clear bilaterally with no rales, rhonchi, or wheezes.    Cardiovascular exam: Rhythm is regular. S1 and S2 are normal. No significant murmur is present. JVP is normal. Lower extremities demonstrate no significant edema. Distal pulses are intact bilaterally.  Abdomen is obese, soft, and nontender.  Musculoskeletal: Spine is straight. Extremities without deformity.  Neuro: Gait is normal.   Skin is warm, dry, and otherwise " intact.      Cardiographics:       Lab Results:   Lab Results   Component Value Date     07/25/2018    K 4.0 07/25/2018     07/25/2018    CO2 25 07/25/2018    BUN 18 07/25/2018    CREATININE 0.86 07/25/2018    CALCIUM 9.6 07/25/2018     Lab Results   Component Value Date    WBC 7.1 09/11/2017    HGB 14.2 09/11/2017    HCT 42.2 09/11/2017    MCV 87 09/11/2017     09/11/2017     Lab Results   Component Value Date    INR 2.6 10/12/2017    INR 2.70 (!) 09/15/2017         Outside record review:

## 2021-06-20 NOTE — PROGRESS NOTES
Order for Durable Medical Equipment was processed and equipment ordered.     DME provider: [facility]    Date Faxed: 9/26/18    Ordering Provider: Dr. Ingram    Equipment ordered: CPAP

## 2021-06-25 NOTE — PROGRESS NOTES
Progress Notes by Holley Lawson MD at 1/17/2017  7:50 AM     Author: Holley Lawson MD Service: -- Author Type: Physician    Filed: 1/17/2017  8:17 AM Encounter Date: 1/17/2017 Status: Signed    : Holley Lawson MD (Physician)           Click to link to [facility]     [facility] HEART CARE NOTE    Thank you, Dr. Grimm, for asking the [facility] team to see Ms. Betty Mitchell to follow-up on paroxysmal atrial fib/flutter.    Assessment/Recommendations   Assessment:    1.  Paroxysmal atrial fib/flutter, resolved with sotalol therapy.  She does report one brief episode of fluttering which she noted after a significant coughing episode which spontaneously resolved.  She has had no other episodes of fluttering.  She does report feeling mildly fatigued which may be due to beta blocker therapy and low heart rates.  I suggest we discontinue her atenolol.  We will leave her sotalol and warfarin as is.  I will have her follow-up in a couple of months with our A. fib nurse practitioner  2.  Hypertension, adequately controlled.      Plan:  1.  His continue atenolol but remain on sotalol at present dose.  2.  Continue warfarin anticoagulation  3.  Follow up in our A. fib clinic in 2 months       History of Present Illness    Ms. Betty Mitchell is a 66 y.o. female with history of paroxysmal atrial fib/flutter and hypertension who is here today for follow-up visit.  She has now been on sotalol 40 mg twice daily and has done well over the past month.  She does report one brief episode of fluttering after a coughing episode but no other symptoms of irregular heartbeat.  She does admit to feeling fatigued.    ECG (personally reviewed): No ECG    ECHO (personally reviewed): No recent echo      Physical Examination Review of Systems   Vitals:    01/17/17 0752   BP: 158/80   Pulse: (!) 56   Resp: 16     Body mass index is 39.46 kg/(m^2).  Wt Readings from Last 3 Encounters:   01/17/17 214 lb (97.1 kg)    12/05/16 210 lb (95.3 kg)   12/02/16 212 lb (96.2 kg)     General Appearance:   Awake, Alert, No acute distress.   HEENT:  No scleral icterus; the mucous membranes were pink and moist.   Neck: No cervical bruits or jugular venous distention    Chest: The spine was straight. The chest was symmetric.   Lungs:   Respirations unlabored; the lungs are clear to auscultation. No wheezing   Cardiovascular:    regular rate and rhythm.  S1, S2 normal.  No murmur, gallop or rub    Abdomen:  No organomegaly, masses, bruits, or tenderness. Bowels sounds are present   Extremities:  no peripheral edema bilaterally    Skin: No xanthelasma. Warm, Dry.   Musculoskeletal: No tenderness.   Neurologic: Mood and affect are appropriate.    General: WNL  Eyes: WNL  Ears/Nose/Throat: WNL  Lungs: WNL  Heart: WNL  Stomach: WNL  Bladder: WNL  Muscle/Joints: WNL  Skin: WNL  Nervous System: Falls  Mental Health: WNL     Blood: WNL     Medical History  Surgical History Family History Social History   Past Medical History   Diagnosis Date   ? Aneurysm      cerebral   ? Aneurysm, cerebral    ? Arrhythmia    ? Atrial fibrillation    ? Hypertension     No past surgical history on file. Family History   Problem Relation Age of Onset   ? Acute Myocardial Infarction Father    ? Breast cancer Sister    ? Coronary Stenting Brother 67   ? Cerebral aneurysm Brother 67   ? Cerebral aneurysm Sister 58    Social History     Social History   ? Marital status: Single     Spouse name: N/A   ? Number of children: N/A   ? Years of education: N/A     Occupational History   ? Not on file.     Social History Main Topics   ? Smoking status: Never Smoker   ? Smokeless tobacco: Not on file   ? Alcohol use Not on file      Comment: <1/week   ? Drug use: No   ? Sexual activity: Not on file     Other Topics Concern   ? Not on file     Social History Narrative          Medications  Allergies   Current Outpatient Prescriptions   Medication Sig Dispense Refill   ? aspirin 81  MG EC tablet Take 81 mg by mouth daily.     ? CALCIUM CARBONATE/VITAMIN D3 (CALCIUM 600 WITH VITAMIN D3 ORAL) Take 1 tablet by mouth daily.     ? DOCOSAHEXANOIC ACID/EPA (FISH OIL ORAL) Take 1,200 mg by mouth daily.     ? FOLIC ACID/MULTIVIT-MIN/LUTEIN (CENTRUM SILVER ORAL) Take 1 tablet by mouth daily.     ? lisinopril-hydrochlorothiazide (PRINZIDE,ZESTORETIC) 20-12.5 mg per tablet Take 1 tablet by mouth daily.  1   ? sotalol (BETAPACE) 80 MG tablet TAKE 1/2 TABLET(40 MG) BY MOUTH TWICE DAILY 90 tablet 3   ? warfarin (COUMADIN) 5 MG tablet Take 5 mg by mouth daily. Take 1 tablet  by mouth daily Sunday, Tuesday, Thursday, Saturday. Take 1/2 tab on Monday, Wednesdays, Fridays Adjust dose based on INR results as directed.       No current facility-administered medications for this visit.       No Known Allergies      Lab Results    Chemistry/lipid CBC Cardiac Enzymes/BNP/TSH/INR   Lab Results   Component Value Date    CHOL 185 07/11/2016    HDL 44 (L) 07/11/2016    LDLCALC 113 07/11/2016    TRIG 141 07/11/2016    CREATININE 0.82 09/27/2016    BUN 14 09/27/2016    K 4.2 09/27/2016     09/27/2016     (H) 09/27/2016    CO2 26 09/27/2016    Lab Results   Component Value Date    WBC 10.0 12/06/2013    HGB 13.1 12/06/2013    HCT 38.6 12/06/2013    MCV 85 12/06/2013     12/06/2013    Lab Results   Component Value Date    TROPONINI <0.01 12/06/2013    BNP 15 12/06/2013    TSH 2.09 09/27/2016    INR 2.40 (!) 12/05/2016

## 2021-06-28 NOTE — PROGRESS NOTES
Progress Notes by Behr-Holewinski, Sierra, RN at 9/16/2019 10:50 AM     Author: Behr-Holewinski, Sierra, RN Service: -- Author Type: Patient Access    Filed: 9/16/2019 10:50 AM Encounter Date: 9/16/2019 Status: Signed    : Behr-Holewinski, Sierra, RN (Registered Nurse)         Zestar Study Device Return    Betty Mitchell returned all the devices for the Zestar study.  Betty Mitchell denies medication changes or adverse events since last visit.    Betty Mitchell has now completed their participation in the Zestar study.   Thank you for your gift of participation.    Sierra Behr-Holewinski

## 2021-06-28 NOTE — PROGRESS NOTES
Progress Notes by Amanda Martin CNP at 9/26/2019  7:50 AM     Author: Amanda Martin CNP Service: -- Author Type: Nurse Practitioner    Filed: 9/26/2019  9:18 AM Encounter Date: 9/26/2019 Status: Signed    : Amanda Martin CNP (Nurse Practitioner)          Click to link to [facility] Heart [facility] HEART [facility] ELECTROPHYSIOLOGY NOTE      Assessment/Recommendations   Assessment/Plan:    Diagnoses and all orders for this visit:    Persistent atrial fibrillation (H) and now 2 years post ablation and no symptoms or EKG evidence of recurrence of atrial fibrillation.    Essential hypertension and well-controlled.  -     diltiazem (CARDIZEM CD) 240 MG 24 hr capsule; Take 1 capsule (240 mg total) by mouth daily after supper.  Dispense: 90 capsule; Refill: 3    KATHYA on CPAP and using CPAP routinely and new over the last year.  No clinical improvement with CPAP.    [id number] score of 3 and on chronic warfarin without problems and not interested in the watchman device.  Follow up in clinic with Dr. Silveira or myself in 1 year.     History of Present Illness    Ms. Betty Mitchell is a very pleasant 68 y.o. female who comes in today for EP follow-up regarding persistent atrial fibrillation.  Betty Mitchell has a known history of paroxysmal atrial fib which was diagnosed in September 2016.  She had a pretty rapid progression of her atrial fib and prior to PVI she was spending at least half the time out of rhythm. Betty has never had a cardioversion.  On September 12, 2017 Betty had pulmonary vein isolation ablation with Dr. Silveira.  She had cryoablation of pulmonary veins plus right CTI flutter line done.  She has had no symptoms of recurrence of A. fib since.  Betty has lost some weight over the last year and is exercising on a routine basis.  This is new for her.  She denies any change in energy level over the last year and tells me she is actually had improvement in her tolerance of  activity.  She can cut the whole lawn without stopping now and could never do that before.  She attributes this change to aggressive exercise program that she is in.    Cardiographics (personally reviewed):  Results for orders placed during the hospital encounter of 09/11/17   Echo ARACELI NETTE Tompkins [ECH12] 09/11/2017    Narrative 1. Normal left ventricular size and systolic performance with a visually   estimated ejection fraction of 55-60%.   2. There is trace aortic insufficiency.   3. Normal right ventricular size and systolic performance.   4. There is mild left atrial enlargement.  5. No thrombus is detected within the left atrium/left atrial appendage.  6. All 4 pulmonary veins are identified and appear to be in normal   anatomic location.  7. Echo contrast examination was performed using agitated NS as contrast   agent. The right heart opacity was adequate and the left heart was   adequately visualized. There was no evidence of right to left shunt during   spontaneous respiration or following release of Valsalva.     The study was performed using a multiplane adult transducer. 2-D,   colorflow Doppler, and spectral Doppler analysis was performed. Informed   consent was obtained prior to the procedure.  Sedation: fentanyl 100 mcg &   versed 1.0 mg iv. Topical anesthesia was performed with viscous lidocaine   and benzocaine spray. The transducer was introduced without difficulty.   There were no complications of the procedure.         Results for orders placed during the hospital encounter of 03/04/19   NM Pharmacologic Stress Test    Narrative   When compared to the images of 11/14/2016, there has been no significant   change.    The left ventricular ejection fraction is 71%.    There is a small to medium sized fixed defect in the apical anterior,   anteroseptal segments. This suggests possibly an area of nontransmural   infarction.    No evidence of inducible myocardial ischemia on the study.      November 2017 2  weeks symptom monitor shows:  Normal event monitor with all sinus and no A. fib seen.       Results for orders placed or performed in visit on 09/11/19   ECG Clinic - Today   Result Value Ref Range    SYSTOLIC BLOOD PRESSURE  mmHg    DIASTOLIC BLOOD PRESSURE  mmHg    VENTRICULAR RATE 59 BPM    ATRIAL RATE 59 BPM    P-R INTERVAL 170 ms    QRS DURATION 98 ms    Q-T INTERVAL 426 ms    QTC CALCULATION (BEZET) 421 ms    P Axis 45 degrees    R AXIS 5 degrees    T AXIS 19 degrees    MUSE DIAGNOSIS       Sinus bradycardia  Otherwise normal ECG  When compared with ECG of 23-FEB-2019 21:37,  No significant change was found  Confirmed by JUAN JOSE TREVINO MD LOC: ([id number]) on 9/11/2019 3:38:13 PM            Problem List:  Patient Active Problem List   Diagnosis   ? Persistent atrial fibrillation (H)   ? Essential hypertension   ? Cerebral aneurysm   ? BERUMEN (dyspnea on exertion)   ? KATHYA on CPAP   ? Status post catheter ablation of atrial fibrillation   ? Chest pain   ? Diarrhea, unspecified type       Physical Examination Review of Systems   Vitals:    09/26/19 0754   BP: 118/64   Pulse: 64   Resp: 16     Body mass index is 38.4 kg/m .  Wt Readings from Last 3 Encounters:   09/26/19 206 lb (93.4 kg)   09/11/19 204 lb 1.6 oz (92.6 kg)   03/22/19 217 lb (98.4 kg)     General Appearance:   Alert, well-appearing and in no acute distress.   HEENT: Atraumatic, normocephalic.  No scleral icterus, normal conjunctivae; mucous membranes pink and moist.     Chest: Chest symmetric, spine straight.   Lungs:   Respirations unlabored: Lungs are clear to auscultation.   Cardiovascular:   Normal first and second heart sounds with no murmurs, rubs, or gallops.  Regular, regular.  Radial and posterior tibial pulses are intact.  Normal JVD, no edema.       Extremities: No cyanosis or clubbing   Musculoskeletal: Moves all extremities   Skin: Warm, dry, intact.    Neurologic: Mood and affect are appropriate, alert and oriented to person, place, time,  and situation    General: WNL  Eyes: WNL  Ears/Nose/Throat: WNL  Lungs: WNL  Heart: WNL  Stomach: Diarrhea  Bladder: WNL  Muscle/Joints: WNL  Skin: WNL  Nervous System: WNL  Mental Health: WNL     Blood: WNL       Medical History  Surgical History Family History Social History   Past Medical History:   Diagnosis Date   ? Aneurysm, cerebral    ? Arrhythmia    ? Cancer (H)     right breast cancer    ? CPAP (continuous positive airway pressure) dependence 10/04/2018    per patient   ? Hypertension    ? Persistent atrial fibrillation (H) 3/14/2017    Dx 2016 Symptomatic [id number] score = 3 (age/ female/ HTN) Rx sotalol Rx warfarin Moderate KATHYA Sept 2017 PVI Sept 2017   ? Sleep apnea 10/04/2018    per pateint   ? Status post catheter ablation of atrial fibrillation 9/12/2017    PVI Sept 12, 2017 (cryo-PVI  + RA-CTI line)    Past Surgical History:   Procedure Laterality Date   ? ANKLE SURGERY      X 2   ? BREAST LUMPECTOMY     ? BREAST LUMPECTOMY Right 2012   ? CARDIAC ELECTROPHYSIOLOGY MAPPING AND ABLATION  9/12/17    PVI   ? EP ABLATION AFLUTTER  9/12/2017    Procedure: EP Ablation Atrial Flutter;  Surgeon: Trevin Silveira MD;  Location: [facility] Cath Lab;  Service:    ? EP ABLATION PVI Left 9/12/2017    PVI Sept 12, 2017---cryo to 6 PVs and RCTI with SWA   ? EYE SURGERY      X 2   ? TUBAL LIGATION      Family History   Problem Relation Age of Onset   ? Acute Myocardial Infarction Father    ? Breast cancer Sister    ? Diabetes type II Brother    ? Cerebral aneurysm Brother 67   ? Cerebral aneurysm Sister 58   ? No Medical Problems Daughter    ? COPD Sister    ? No Medical Problems Sister    ? No Medical Problems Sister    ? Coronary Stenting Brother    ? Heart attack Brother 74   ? Hypertension Brother    ? Alcohol abuse Brother     Social History     Tobacco Use   Smoking Status Never Smoker   Smokeless Tobacco Never Used     Social History     Substance and Sexual Activity   Alcohol Use Yes   ? Alcohol/week: 1.0  standard drinks   ? Types: 1 Glasses of wine per week    Comment: <1/week          Medications  Allergies   Current Outpatient Medications   Medication Sig Dispense Refill   ? CALCIUM CARBONATE/VITAMIN D3 (CALCIUM 600 WITH VITAMIN D3 ORAL) Take 1 tablet by mouth daily.     ? diltiazem (CARDIZEM CD) 240 MG 24 hr capsule Take 1 capsule (240 mg total) by mouth daily after supper. 90 capsule 3   ? DOCOSAHEXANOIC ACID/EPA (FISH OIL ORAL) Take 1,200 mg by mouth daily.     ? FOLIC ACID/MULTIVIT-MIN/LUTEIN (CENTRUM SILVER ORAL) Take 1 tablet by mouth daily.     ? lisinopril-hydrochlorothiazide (PRINZIDE,ZESTORETIC) 20-12.5 mg per tablet Take 1 tablet by mouth daily.  1   ? warfarin (COUMADIN) 5 MG tablet Take 2.5-5 mg by mouth daily. 2.5mg (1/2 tablet) on Monday and 5mg (1 tablet) on all other days of the week             No current facility-administered medications for this visit.       No Known Allergies   Medical, surgical, family, social history, and medications were all reviewed and updated as necessary.   Lab Results    Chemistry CBC/INR CHOLESTROL   Lab Results   Component Value Date    CREATININE 0.89 09/04/2019    BUN 11 09/04/2019     09/04/2019    K 4.2 09/04/2019     09/04/2019    CO2 27 09/04/2019     Creatinine (mg/dL)   Date Value   09/04/2019 0.89   02/24/2019 0.77   02/23/2019 0.79   07/25/2018 0.86     Lab Results   Component Value Date    BNP 15 12/06/2013    Lab Results   Component Value Date    WBC 10.2 05/08/2019    HGB 14.3 05/08/2019    HCT 44.2 05/08/2019    MCV 87 05/08/2019     05/08/2019     Lab Results   Component Value Date    INR 2.24 (H) 02/24/2019      Lab Results   Component Value Date    CHOL 194 09/04/2019    HDL 49 (L) 09/04/2019    LDLCALC 107 09/04/2019    TRIG 192 (H) 09/04/2019          Amanda Martin RN,  [facility]   Electrophysiology  [phone]

## 2021-06-28 NOTE — PROGRESS NOTES
Progress Notes by Rodriguez Reed NP at 9/11/2019  8:00 AM     Author: Rodriguez Reed NP Service: -- Author Type: Nurse Practitioner    Filed: 9/11/2019 10:58 AM Encounter Date: 9/11/2019 Status: Signed    : Rodriguez Reed NP (Nurse Practitioner)        Zestar Study    Physical Examination  For abnormal findings, please evaluate if the finding is Clinically Significant (by 'CS') or Not Clinically Significant (by 'NCS')  General Appearance Normal  Head and Neck  Normal  Lungs    Normal  Cardiovascular  Abnormal- Sinus Bradycardia HR 59  Abdomen   Normal  Musculoskeletal/Extremities Normal   Lymph Nodes  Normal  Skin    Normal  Neurological   Normal   Tremor absent     If present, evaluate severity on 1-10 scale    Rodriguez Reed NP

## 2021-06-28 NOTE — PROGRESS NOTES
Progress Notes by Sofya Styles at 9/11/2019  8:00 AM     Author: Sofya Styles Service: -- Author Type: Patient Access    Filed: 9/22/2019  8:56 PM Encounter Date: 9/11/2019 Status: Signed    : Trevin Silveira MD (Physician)    Related Notes: Original Note by Sofya Styles (Patient Access) filed at 9/11/2019 10:58 AM           [facility] Study Inclusion / Exclusion Criteria  Protocol Version One    Inclusion Criteria    Yes No Criteria # Subject must meet all inclusion criteria:      [x]    []  1 At least 18 years old for NSR and 22 years old for Non-NSR. Inclusive for both cohorts, at time of screening, with no upper limit on age.        [x]      []  2 To be enrolled as a non-NSR subject the volunteer must have one of the following conditions: Permanent/Persistent AF, Hx of paroxysmal AF, Hx of High-rate AF, AF + rate control medication, Hx Atrial Flutter, PVC burden >1%, Frequent PACs, Hx BBB, Hx 2nd degree block (any type), Hx Bigeminy/Trigeminy/Quadgeminy, Hx Tachycardia. For subjects with any of the following diagnoses, the condition must be present at the time of screening:   ? Permanent/persistent AF  ? AF plus rate control medication  ? PVC [place]  ? Frequent PACs   Note: If not present at screening, subjects may not be enrolled as a non-NSR subject or NSR subject.         3  [x] N/A HE site For Subjects to be enrolled as NSR they must be in NSR at the time of screening as determined by the investigator. For subjects with occasional ectopic beats (<1% burden during screening), they should still qualify as individuals in the NSR cohort as long as they don't have a medical history/diagnosis of significant ectopic burden.    [x]  []  4 Able to read and understand a written ICF    [x]  []  5 Willing and able to participate in the study procedures and comply with its restrictions    [x]  []  6 Able to communicate effectively with study staff as well as understand and follow directions    All must be  Yes    Exclusion Criteria-all must be no  Yes No Criteria # Subject must not meet any exclusion criteria:    []  [x]  1 Physical disability that prevents safe and adequate testing.   []  [x]  2 Pregnant women or women planning to become pregnant   []  [x]  3 Any acute illness or condition that may interfere with study procedures (e.g. cough, fever, sore throat, headache, sunburn, etc.)   []  [x]  4 Clinically significant hand tremors, as judged by the Investigator   []  [x]  5 Resting hypertension with systolic blood pressure ?161 mmHg or diastolic blood pressure ?101 mmHg (if at least 2 of 3 measurements meet this criteria)   []  [x]  6 Subjects with implanted cardiac devices such as a pacemaker or an automated implantable cardioverter- defibrillator (AICD)   []  [x]  7 Acute myocardial infarction (MI) within 90 days from the screening visit   []  [x]  8 Other cardiovascular disease that increases the risk to the subject or would render the data uninterpretable in the opinion of the Investigator (e.g., recent or ongoing unstable angina, significant valvular heart disease or chronic heart failure, myocarditis or pericarditis)    []  [x]  9 Acute pulmonary embolism, pulmonary infarction, or deep vein thrombosis within 90 days from the screening visit    []  [x]  10 Stroke or transient ischemic attack within 90 days from the screening visit    []  [x]  11 Known untreated medical conditions as determined by the Investigator, such as but not limited to significant anemia, important electrolyte imbalance and untreated or uncontrolled thyroid disease.    []  [x]  12 Any history of wrist surgery with scarring in the area of the sensor location on the wrist where the subject will be wearing the watch;    []  [x]  13 Open wound(s) on the wrist and forearm where the subject will be wearing the watch    []  [x]  14 Severe symptomatic (or active) overly dry/injured skin, skin disorders, or allergic skin reactions such as  eczema, rosacea, impetigo, dermatomyositis or allergic contact dermatitis on wrist and locations where the electrodes will be placed (e.g. chest, forearms, stomach), as determined by the investigator.    []  [x]  15 Tattoos, scars or moles in the area of the sensor location on the wrist where the subject will be wearing the watch    []  [x]  16 Device wearing Wrist circumference ? 129 mm or ? 246 mm    []  [x]  17 Known significant sensitivity to medical adhesives or isopropyl alcohol (for ECG electrode placement)    []  [x]  18 Known allergy or sensitivity to fluorocarbon-based synthetic rubber, such as contact dermatitis with fluoroelastomer bands primarily used in wrist worn fitness devices    []  [x]  19 Subjects with any Medical History, Physical exam, vital sign or any other study procedure finding/assessment that in the opinion of the investigator could compromise subject safety during study participation or interfere with the study integrity and/or the accurate assessment of the study objectives    []  [x]  20 Subject works for a company that develops or sells medical and/or fitness devices (e.g., ECG monitors, wearable fitness bands, sleep monitors, etc.) or are technology journalists (e.g., professional bloggers, TV, magazine, newspaper reporters, etc.)    []  [x]  21 Weight > 181 kg for subjects using the stationary bike and/or treadmill. Weight of ?138 kg for NSR subjects.    []  [x]  22 Subject is employed in shift work, or otherwise does not maintain a reasonably consistent day/night schedule (e.g. Subjects who go to bed after 4am).    []  [x]  23 Overnight travel planned during data collection nights    []  [x]  24 Non-NSR subjects should not have partaken in strenuous physical activity within 12 hours prior to screening    []  [x]  25 Non-NSR subjects with Atrial fibrillation categories: Subjects taking Class 1 or Class 3 antiarrhythmic agents such as the following may not take part in any stage of  the study: amiodarone, sotalol, dronedarone, ibutilide, dofetilide, propafenone, quinidine, procainamide, disopyramide, flecainide (Subjects taking class 2, 4 or 5 antiarrhythmic agents may take part the study).      Inclusion/exclusion criteria reviewed, inclusion criteria are met.  No exclusion criteria.    Sofya Styles

## 2021-06-28 NOTE — PROGRESS NOTES
Progress Notes by Sofya Styles at 9/11/2019  8:00 AM     Author: Sofya Styles Service: -- Author Type: Patient Access    Filed: 9/26/2019  3:42 PM Encounter Date: 9/11/2019 Status: Addendum    : Sofya Styles (Patient Access)    Related Notes: Original Note by Sofya Styles (Patient Access) filed at 9/11/2019 10:58 AM             Zestar Study Consent Visit    Study description: ECG and PPG Study: Zestar Study      Note time seated: 8:02 am    Betty Mitchell a 68 y.o. female , was seen in [facility] today to discuss participation in the Zestar study.   The consent discussion began on 10 Sep 2019.  Please refer to phone call note from Roula Mcdonald for more details.  The consent form was reviewed with the patient.     The review of the study included:    Study purpose     Conflict of interest    Device description      Study visits    Risks of participation    Benefits (if any)    Alternatives    Voluntary participation    Confidentiality     Compensation/costs of participation    Study stipends    Injury and legal rights    The subject was provided time to review the consent form and consider participation. her questions were answered to her satisfaction.   The patient has voluntarily agreed to participate in the above noted study.     The consent form version 6 Aug 2019 and HIPPA form version 11 Jun 2019 was signed 09/11/19 at 8:21 am    The subject was provided with a copy of the consent form and HIPPA. A copy of the signed forms was forwarded to medical records.    No study procedures were done prior to Betty Mitchell providing informed consent.     Sofya Styles    Subject Restrictions During Study -Confirmed with Subject prior to any study procedures completed    Restrictions on jewelry, recreational drugs, caffeine, and exercise few days prior and during study.   1. Subjects should not consume excessive amount of caffeine (6 or more 8-oz cups of coffee, or more than 570 mg of caffeine from  "energy drinks, pills or similar substance) during their participation in the study.   2. Subjects should not consume excessive amount of alcohol for the duration of their participation in the study. A typical moderate amount is allowed during stage 3.   3. Subjects should not take any recreational drugs (including, but not limited to methamphetamines, cocaine, opioids, cannabis, LSD) for the duration of their participation in the study.   4. Subjects should not wear underwire bra or jewelry during the in-lab study (to not interfere with electrode placement and ECG data recordings).   5. Subject will not be permitted to have their cell phone or any electronic recording device on or with them during the in-lab test session(s).   6. Subjects under 22 years old will not be permitted to take ECG recordings through the ECG liliana on the wrist-worn devices.     For study stage 3 only   1. Subjects should only do high intensity exercise (e.g. sprinting, heavy lifting, etc.) in the morning upon awakening or else not at all   2. Subjects should abstain from swimming during the time of the study   3. Subjects should only shower in the morning upon awakening (or else not at all)   4. Female subjects are strongly suggested to wear non-underwire bras throughout this stage of the study     Sofyaryan Styles      Study Data collections   Vitals  (TPBP)     Vitals:    09/11/19 0825 09/11/19 0826 09/11/19 0827   BP: 133/79 140/79 141/77   Patient Site: Right Arm Right Arm Right Arm   Patient Position: Sitting Sitting Sitting   Cuff Size: Adult Regular Adult Regular Adult Regular   Pulse: 63 64 63   Resp:   16   Temp:   97.9  F (36.6  C)   Weight:   204 lb 1.6 oz (92.6 kg)   Height:   5' 1\" (1.549 m)      VS taken after 5 min rest     MAP 1    101  MAP 2      104   MAP 3             103        Body mass index is 38.56 kg/m .  female  1950  68 y.o.      Note time patient placed in supine position: 8:32 am    Ethnicity   []   " or    [x]  Not  or    Race   []   or    []    []  Black or   []   or Other   [x]  White  Physical Activity Level  per subject report:   []  0- Extremely Inactive []  1- Sedentary [x]  2- Moderately Active  []  3- Vigorously Active []  4- Extremely Active  Trained Athlete   [] Yes  [x] No     Hernandez's' Skin type   [] Type 1 [x] Type 2 [] Type 3    [] Type 4 [] Type 5 [] Type 6    Subject participated in previous ECG study at [facility]: [] Yes    [x] No    Past Medical History:   Diagnosis Date   ? Aneurysm, cerebral    ? Arrhythmia    ? Cancer (H)     right breast cancer    ? CPAP (continuous positive airway pressure) dependence 10/04/2018    per patient   ? Hypertension    ? Persistent atrial fibrillation (H) 3/14/2017    Dx 2016 Symptomatic [id number] score = 3 (age/ female/ HTN) Rx sotalol Rx warfarin Moderate KATHYA Sept 2017 PVI Sept 2017   ? Sleep apnea 10/04/2018    per pateint   ? Status post catheter ablation of atrial fibrillation 9/12/2017    PVI Sept 12, 2017 (cryo-PVI  + RA-CTI line)       HISTORY OF HEART RHYTHM ABNORMALITIES   []  None   []  Atrial Flutter  [] Frequent PACs (>3 in 30 secs)  [] AF (permanent)  []  Tachycardia  [] Bigeminy, trigeminy, and/or quadgeminy  []  AF (persistent)  []  Heart Block   [x]  AF (paroxysmal)  [] PVCs    [] AF (other)    [] BBB    []  Other:    How many years? 3  Special interest allergies: active allergic skin reactions  No Known Allergies    Current Outpatient Medications:   ?  CALCIUM CARBONATE/VITAMIN D3 (CALCIUM 600 WITH VITAMIN D3 ORAL), Take 1 tablet by mouth daily., Disp: , Rfl:   ?  diltiazem (CARDIZEM CD) 240 MG 24 hr capsule, Take 240 mg by mouth daily after supper., Disp: , Rfl:   ?  DOCOSAHEXANOIC ACID/EPA (FISH OIL ORAL), Take 1,200 mg by mouth daily., Disp: , Rfl:   ?  FOLIC ACID/MULTIVIT-MIN/LUTEIN (CENTRUM SILVER ORAL), Take 1 tablet by mouth  "daily., Disp: , Rfl:   ?  lisinopril-hydrochlorothiazide (PRINZIDE,ZESTORETIC) 20-12.5 mg per tablet, Take 1 tablet by mouth daily., Disp: , Rfl: 1  ?  loperamide (IMODIUM A-D) 2 mg tablet, Take 1 tablet (2 mg total) by mouth 4 (four) times a day as needed for diarrhea., Disp: , Rfl: 0  ?  warfarin (COUMADIN) 5 MG tablet, Take 2.5-5 mg by mouth daily. 2.5mg (1/2 tablet) on Monday and 5mg (1 tablet) on all other days of the week   , Disp: , Rfl:       10-sec 12-lead ECG & 30-sec 12-lead ECG rhythm strip done; reviewed by & PE done by Rodriguez Reed  Subject Questionnaire    OCCUPATION: Retired   Predominately works outdoors  [] Yes    [x] No      Hours/week spent outdoors (total, not only for work): 25    Frequently participates in \"hand intensive\" activities [x] Yes [] No  Caffeine  []  Never  [x] Occasionally        []  Daily (1 cup/day)     []  Daily (>1 cup/day)    Alcohol   []  Never  [x] Light (drink or 2 occasionally) []  Moderate (a drink or 2 almost daily)   []  Occasional-heavy (more than a few drinks <2x / month)  [] Heavy (more than a few drinks >2x / month)    Tobacco/nicotine  [x]  Never  [] Rarely  []  Frequently/ Daily     Mattress Information  [] Subject did not participate in Stage 3  Mattress type:  []  Memory foam [] Gel  [x] Innerspring (coil)  [] Airbed  [] Waterbed [] Shikibuton  [] Hybrid  [] No mattress  [] Other    Mattress foundation    [] Mattress on floor/ground    [] Mattress on foundation/box spring on floor/ground  [] Mattress on foundation/box spring on bed frame  [] Mattress on tatami on floor/ground  [x] Other: mattress is on bed frame with no box spring     Mattress topper   [x] No mattress topper  [] Pillow top  [] Foam top - flat style  [] Foam top - \"egg crate\" style  [] Other    Co-sleeper    []  Yes  [x]  No    CPAP use   [x] Yes  [] No      Dominant hand [] left  [x] right [] ambidextrous  Preferred Wrist to wear band on   [x]  left   []  right   Were screening day & study " day: [x]  same [] different   Same: wrist circumference:      160   mm     Study day: wrist circumference:     160   mm  Device wearing wrist skin fold thickness:       8.6  mm  Wrist Band Size:     [x]  Flush Fit S/M  []  Non-Flush Fit S/M   []  Flush Fit M/L  []  Non-Flush Fit M/L    Preferred/natural band notch: 4  Secure band notch: 4  Crown orientation:  []  left   [x]  right  Device wearing wrist hairiness:     [x]  Light []  Medium       []  Heavy  Spectophotometer    L  A  B   Reading #1  64.47  8.60  18.08   Reading #2  63.67  8.75  19.21   Reading #3  61.12  10.57  19.81     Pregnancy test  for WOCBP     [x] n/a male or female not child bearing potential  Room Temperature ( C): 18  CS Laptop ID: [id number]  CS Cam ID:[id number]  Device Set ID:[id number]  Wrist Device ID:[id number]  Subject has now completed their in-house participation in the Zestar study. Subject will complete Stage 3 at home for the next 3 days and return the equipment on  16 Sep 2019  Sofya Styles

## 2021-06-30 NOTE — PROGRESS NOTES
"Progress Notes by Amanda Martin CNP at 12/9/2020  8:30 AM     Author: Amanda Martin CNP Service: -- Author Type: Nurse Practitioner    Filed: 12/9/2020  8:59 AM Encounter Date: 12/9/2020 Status: Signed    : Amanda Martin CNP (Nurse Practitioner)           The patient has been notified of following:     \"This video visit will be conducted via a call between you and your physician/provider. We have found that certain health care needs can be provided without the need for an in-person physical exam.  This service lets us provide the care you need with a video conversation.  If a prescription is necessary we can send it directly to your pharmacy.  If lab work is needed we can place an order for that and you can then stop by our lab to have the test done at a later time.      Patient has given verbal consent to a Video visit? Yes    HEART CARE VIDEO ENCOUNTER        The patient has chosen to have the visit conducted as a video visit, to reduce risk of exposure given the current status of Coronavirus in our community. This video visit is being conducted via a call between the patient and physician/provider. Health care needs are being provided without a physical exam.     Assessment/Recommendations   Assessment/Plan:  Paroxysmal atrial fibrillation and no symptoms of recurrence of A. fib and now 3 years post ablation.  Remains on anticoagulation.  [id number] score of 3 and on warfarin.  Discussed anticipate Eliquis will go generic within the next year and if affordable she would be a good candidate to switch over.  She has normal creatinine on last check and dose would be 5 mg 1 tablet orally every 12 hours with or without food.  No need for INRs with this and no food or alcohol interactions.  Questions answered.  Essential hypertension and blood pressure at goal via home monitoring.  KATHYA on CPAP with clinical improvement with use per sleep note.    Follow Up Plan: Clinic with me in a year " and at that point may consider switching follow-ups to as needed if no  cardiac issues.  I have reviewed the note as documented.  This accurately captures the substance of my conversation with the patient.    Total time of video between patient and provider was 14 minutes   Start Time:0832   Stop Time:0846    Originating Location (pt. Location): Home    Distant Location (provider location):  [facility]     Mode of Communication:  Video Conference via [employer]       History of Present Illness/Subjective    Betty Mitchell is a 70 y.o. female who is being evaluated via a billable video visit and has consented to a video visit. Betty Mitchell has a history of paroxysmal atrial fib which was diagnosed in September 2016.  She had a pretty rapid progression of her atrial fib and prior to PVI she was spending at least half the time out of rhythm. Betty has never had a cardioversion.  On September 12, 2017 Betty had pulmonary vein isolation ablation with Dr. Silveira.  She had cryoablation of pulmonary veins plus right CTI flutter line done.  She has had no symptoms of recurrence of A. fib since.   Betty denies any symptoms of atrial fibrillation but tells me that she did not  know in the past when she was out of rhythm.  She sees Dr. Grimm once a month for INR and he always listens to her heart and regular them.  She had a hospital admission last February due to sudden onset of midsternal chest pain.  This was relieved with the nitro.  She had a stress test the year before and no further work-up was done.  She states that they believe it was esophageal spasm.  She has had a similar episode about 2 years ago.  She has been golfing and very active doing yard work throughout the summer.  She has had no chest pain or other cardiac symptoms.  She is doing well.  She is less active since the weather has gotten colder and encouraged to do increase activity and to exercise such as walking.  She also  plans to do some light weight lifting.    I have reviewed and updated the patient's Past Medical History, Social History, Family History and Medication List.   Cardiographics reviewed and prep for this visit are as follows:  March 2019 nuclear stress test shows:  Conclusion      When compared to the images of 11/14/2016, there has been no significant change.    The left ventricular ejection fraction is 71%.    There is a small to medium sized fixed defect in the apical anterior, anteroseptal segments. This suggests possibly an area of nontransmural infarction.    No evidence of inducible myocardial ischemia on the study.     Echo ARACELI W Bubble [ECH12] 09/11/2017     Narrative 1. Normal left ventricular size and systolic performance with a visually   estimated ejection fraction of 55-60%.   2. There is trace aortic insufficiency.   3. Normal right ventricular size and systolic performance.   4. There is mild left atrial enlargement.  5. No thrombus is detected within the left atrium/left atrial appendage.  6. All 4 pulmonary veins are identified and appear to be in normal   anatomic location.  7. Echo contrast examination was performed using agitated NS as contrast   agent. The right heart opacity was adequate and the left heart was   adequately visualized. There was no evidence of right to left shunt during   spontaneous respiration or following release of Valsalva.      The study was performed using a multiplane adult transducer. 2-D,   colorflow Doppler, and spectral Doppler analysis was performed. Informed   consent was obtained prior to the procedure.  Sedation: fentanyl 100 mcg &   versed 1.0 mg iv. Topical anesthesia was performed with viscous lidocaine   and benzocaine spray. The transducer was introduced without difficulty.   There were no complications of the procedure.       November 2017 2 weeks symptom monitor shows:  Normal event monitor with all sinus and no A. fib seen.    Physical Examination  performed via live video encounter Review of Systems   General Appearance:   no distress, normal body habitus, upright.   ENT/Mouth: membranes moist, no nasal discharge or bleeding gums.  Normal head shape, no evidence of injury or laceration.     EYES:  no scleral icterus, normal conjunctivae   Neck: no evidence of thyromegaly.  Supple   Chest/Lungs:   No audible wheezing equal chest wall expansion. Non labored breathing.  No cough.   Cardiovascular:   No evidence of elevated jugular venous pressure.  No evidence of pitting edema bilaterally    Abdomen:  no evidence of abdominal distention. No observe juandice.     Extremities: no cyanosis or clubbing noted.    Skin: no xanthelasma, normal skin color. No evidence of facial lacerations.      Neurologic: Normal arm motion bilateral, no tremors.  No evidence of focal defect.       Psychiatric: alert and oriented x3, calm                                               Medical History  Surgical History Family History Social History   Past Medical History:   Diagnosis Date   ? Aneurysm, cerebral    ? Arrhythmia    ? Cancer (H)     right breast cancer    ? CPAP (continuous positive airway pressure) dependence 10/04/2018    per patient   ? Hypertension    ? Persistent atrial fibrillation (H) 3/14/2017    Dx 2016 Symptomatic [id number] score = 3 (age/ female/ HTN) Rx sotalol Rx warfarin Moderate KATHYA Sept 2017 PVI Sept 2017   ? Sleep apnea 10/04/2018    per pateint   ? Status post catheter ablation of atrial fibrillation 9/12/2017    PVI Sept 12, 2017 (cryo-PVI  + RA-CTI line)    Past Surgical History:   Procedure Laterality Date   ? ANKLE SURGERY      X 2   ? BREAST LUMPECTOMY     ? BREAST LUMPECTOMY Right 2012   ? CARDIAC ELECTROPHYSIOLOGY MAPPING AND ABLATION  9/12/17    PVI   ? EP ABLATION AFLUTTER  9/12/2017    Procedure: EP Ablation Atrial Flutter;  Surgeon: Trevin Silveira MD;  Location: [facility] Cath Lab;  Service:    ? EP ABLATION PVI Left 9/12/2017    PVI Sept  12, 2017---cryo to 6 PVs and RCTI with SWA   ? EYE SURGERY      X 2   ? TUBAL LIGATION      Family History   Problem Relation Age of Onset   ? Acute Myocardial Infarction Father    ? Breast cancer Sister    ? Diabetes type II Brother    ? Cerebral aneurysm Brother 67   ? Cerebral aneurysm Sister 58   ? No Medical Problems Daughter    ? COPD Sister    ? No Medical Problems Sister    ? No Medical Problems Sister    ? Coronary Stenting Brother    ? Heart attack Brother 74   ? Hypertension Brother    ? Alcohol abuse Brother       Social History     Socioeconomic History   ? Marital status: Single     Spouse name: Not on file   ? Number of children: Not on file   ? Years of education: Not on file   ? Highest education level: Not on file   Occupational History   ? Occupation: retired      Employer:    Social Needs   ? Financial resource strain: Not on file   ? Food insecurity     Worry: Not on file     Inability: Not on file   ? Transportation needs     Medical: Not on file     Non-medical: Not on file   Tobacco Use   ? Smoking status: Never Smoker   ? Smokeless tobacco: Never Used   Substance and Sexual Activity   ? Alcohol use: Yes     Alcohol/week: 1.0 standard drinks     Types: 1 Glasses of wine per week     Comment: <1/week   ? Drug use: No   ? Sexual activity: Not on file   Lifestyle   ? Physical activity     Days per week: Not on file     Minutes per session: Not on file   ? Stress: Not on file   Relationships   ? Social connections     Talks on phone: Not on file     Gets together: Not on file     Attends Jewish service: Not on file     Active member of club or organization: Not on file     Attends meetings of clubs or organizations: Not on file     Relationship status: Not on file   ? Intimate partner violence     Fear of current or ex partner: Not on file     Emotionally abused: Not on file     Physically abused: Not on file     Forced sexual activity: Not on file   Other Topics Concern   ? Not on file    Social History Narrative   ? Not on file          Medications  Allergies   Current Outpatient Medications   Medication Sig Dispense Refill   ? calcium carbonate-vitamin D3 (CALCIUM 600 + D,3,) 600 mg(1,500mg) -400 unit per tablet Take 1 tablet by mouth daily.     ? CARTIA  mg 24 hr capsule TAKE 1 CAPSULE BY MOUTH DAILY AFTER SUPPER 90 capsule 0   ? DOCOSAHEXANOIC ACID/EPA (FISH OIL ORAL) Take 1,200 mg by mouth daily.     ? FOLIC ACID/MULTIVIT-MIN/LUTEIN (CENTRUM SILVER ORAL) Take 1 tablet by mouth daily.     ? lisinopril-hydrochlorothiazide (PRINZIDE,ZESTORETIC) 20-12.5 mg per tablet Take 1 tablet by mouth daily.  1   ? warfarin (COUMADIN) 5 MG tablet Take 2.5-5 mg by mouth daily. 2.5mg (1/2 tablet) on Monday and Friday. 5mg (1 tablet) on all other days of the week       No current facility-administered medications for this visit.     No Known Allergies      Lab Results    Chemistry/lipid CBC Cardiac Enzymes/BNP/TSH/INR   Lab Results   Component Value Date    CHOL 206 (H) 09/16/2020    HDL 51 09/16/2020    LDLCALC 119 09/16/2020    TRIG 178 (H) 09/16/2020    CREATININE 0.87 09/16/2020    BUN 14 09/16/2020    K 3.9 09/16/2020     09/16/2020     09/16/2020    CO2 24 09/16/2020    Lab Results   Component Value Date    WBC 9.7 03/02/2020    HGB 13.1 03/02/2020    HCT 39.7 03/02/2020    MCV 89 03/02/2020     03/02/2020    Lab Results   Component Value Date    TROPONINI <0.01 03/02/2020    BNP 15 12/06/2013    TSH 2.27 09/16/2020    INR 2.15 (H) 03/02/2020        Amanda Martin

## 2021-08-03 PROBLEM — I42.9 CARDIOMYOPATHY (H): Status: RESOLVED | Noted: 2017-03-14 | Resolved: 2017-04-24

## 2021-09-08 ENCOUNTER — LAB REQUISITION (OUTPATIENT)
Dept: LAB | Facility: CLINIC | Age: 71
End: 2021-09-08

## 2021-09-08 DIAGNOSIS — E78.5 HYPERLIPIDEMIA, UNSPECIFIED: ICD-10-CM

## 2021-09-08 DIAGNOSIS — I10 ESSENTIAL (PRIMARY) HYPERTENSION: ICD-10-CM

## 2021-09-08 LAB
ALBUMIN SERPL-MCNC: 3.6 G/DL (ref 3.5–5)
ALP SERPL-CCNC: 88 U/L (ref 45–120)
ALT SERPL W P-5'-P-CCNC: 32 U/L (ref 0–45)
ANION GAP SERPL CALCULATED.3IONS-SCNC: 13 MMOL/L (ref 5–18)
AST SERPL W P-5'-P-CCNC: 31 U/L (ref 0–40)
BILIRUB SERPL-MCNC: 0.6 MG/DL (ref 0–1)
BUN SERPL-MCNC: 15 MG/DL (ref 8–28)
CALCIUM SERPL-MCNC: 9.3 MG/DL (ref 8.5–10.5)
CHLORIDE BLD-SCNC: 106 MMOL/L (ref 98–107)
CHOLEST SERPL-MCNC: 198 MG/DL
CO2 SERPL-SCNC: 23 MMOL/L (ref 22–31)
CREAT SERPL-MCNC: 0.8 MG/DL (ref 0.6–1.1)
GFR SERPL CREATININE-BSD FRML MDRD: 74 ML/MIN/1.73M2
GLUCOSE BLD-MCNC: 97 MG/DL (ref 70–125)
HDLC SERPL-MCNC: 47 MG/DL
LDLC SERPL CALC-MCNC: 106 MG/DL
POTASSIUM BLD-SCNC: 3.9 MMOL/L (ref 3.5–5)
PROT SERPL-MCNC: 7.2 G/DL (ref 6–8)
SODIUM SERPL-SCNC: 142 MMOL/L (ref 136–145)
TRIGL SERPL-MCNC: 223 MG/DL

## 2021-09-08 PROCEDURE — 82040 ASSAY OF SERUM ALBUMIN: CPT | Performed by: FAMILY MEDICINE

## 2021-09-08 PROCEDURE — 80061 LIPID PANEL: CPT | Performed by: FAMILY MEDICINE

## 2021-09-11 ENCOUNTER — HEALTH MAINTENANCE LETTER (OUTPATIENT)
Age: 71
End: 2021-09-11

## 2022-01-01 ENCOUNTER — HEALTH MAINTENANCE LETTER (OUTPATIENT)
Age: 72
End: 2022-01-01

## 2022-02-15 ENCOUNTER — OFFICE VISIT (OUTPATIENT)
Dept: CARDIOLOGY | Facility: CLINIC | Age: 72
End: 2022-02-15
Payer: COMMERCIAL

## 2022-02-15 VITALS
DIASTOLIC BLOOD PRESSURE: 70 MMHG | HEART RATE: 60 BPM | HEIGHT: 61 IN | WEIGHT: 208 LBS | RESPIRATION RATE: 16 BRPM | BODY MASS INDEX: 39.27 KG/M2 | SYSTOLIC BLOOD PRESSURE: 122 MMHG

## 2022-02-15 DIAGNOSIS — I10 ESSENTIAL HYPERTENSION: ICD-10-CM

## 2022-02-15 DIAGNOSIS — I48.0 PAROXYSMAL ATRIAL FIBRILLATION (H): Primary | ICD-10-CM

## 2022-02-15 DIAGNOSIS — G47.33 OSA ON CPAP: ICD-10-CM

## 2022-02-15 PROCEDURE — 99213 OFFICE O/P EST LOW 20 MIN: CPT | Performed by: NURSE PRACTITIONER

## 2022-02-15 RX ORDER — ALENDRONATE SODIUM 70 MG/1
70 TABLET ORAL WEEKLY
COMMUNITY
Start: 2021-09-19

## 2022-02-15 ASSESSMENT — MIFFLIN-ST. JEOR: SCORE: 1385.86

## 2022-02-15 NOTE — LETTER
2/15/2022    Vel Grimm MD  [facility] [address] [place] [address]    RE: Betty Mitchell       Dear Colleague,     I had the pleasure of seeing Betty Mitchell in the [facility].    Thank you, Dr. Grimm, for asking the [facility] team to see Ms. Betty Mitchell to evaluate paroxysmal atrial fibrillation.    Assessment/Recommendations     Assessment/Plan:    Diagnoses and all orders for this visit:  Paroxysmal atrial fibrillation (H) with high burden prior to PVI.  No symptoms or ECG evidence of recurrence of A. fib since ablation and is now 4-1/2 years out from procedure.  Essential hypertension and blood pressure at goal.  KATHYA and continues to use CPAP routinely.  Stressed the importance of adherence to the CPAP.    [id number] score of 3 warfarin and on warfarin.  She reports that they listen to her every time she has an INR and never been noted to be in A. fib on physical exam since ablation.  Follow up in clinic with me as needed.  To call if symptoms of recurrence of atrial fibrillation..     History of Present Illness/Subjective     Betty Mitchell is a very pleasant 71 year old female who comes in today for EP follow-up for paroxysmal atrial fibrillation.  Betty Mitchell has a known history of paroxysmal atrial fib which was diagnosed in September 2016.  She had a pretty rapid progression of her atrial fib and prior to PVI she was spending at least half the time out of rhythm. Betty has never had a cardioversion.  On September 12, 2017 Betty had pulmonary vein isolation ablation with Dr. Silveira.  She had cryoablation of pulmonary veins plus right CTI flutter line done.  She has had no symptoms of recurrence of A. fib since.  Betty's medical history is significant for hypertension and KATHYA on CPAP.  Betty reports that she has been more active and noticing more energy and activity tolerance remains the same throughout the last year.  She denies any  cardiac symptoms.  She denies any symptoms of recurrence of atrial fibrillation.  She is pleased with how well she has done post PVI.    Cardiographics (reviewed):  March 2019 nuclear stress test shows:  Conclusion       When compared to the images of 11/14/2016, there has been no significant change.    The left ventricular ejection fraction is 71%.    There is a small to medium sized fixed defect in the apical anterior, anteroseptal segments. This suggests possibly an area of nontransmural infarction.    No evidence of inducible myocardial ischemia on the study.           Echo ARACELI W Bubble [ECH12] 09/11/2017     Narrative 1. Normal left ventricular size and systolic performance with a visually   estimated ejection fraction of 55-60%.   2. There is trace aortic insufficiency.   3. Normal right ventricular size and systolic performance.   4. There is mild left atrial enlargement.  5. No thrombus is detected within the left atrium/left atrial appendage.  6. All 4 pulmonary veins are identified and appear to be in normal   anatomic location.  7. Echo contrast examination was performed using agitated NS as contrast   agent. The right heart opacity was adequate and the left heart was   adequately visualized. There was no evidence of right to left shunt during   spontaneous respiration or following release of Valsalva.      The study was performed using a multiplane adult transducer. 2-D,   colorflow Doppler, and spectral Doppler analysis was performed. Informed   consent was obtained prior to the procedure.  Sedation: fentanyl 100 mcg &   versed 1.0 mg iv. Topical anesthesia was performed with viscous lidocaine   and benzocaine spray. The transducer was introduced without difficulty.   There were no complications of the procedure.       November 2017 2 weeks symptom monitor shows:  Normal event monitor with all sinus and no A. fib seen.         Problem List:  Patient Active Problem List   Diagnosis     Persistent atrial  "fibrillation (H)     Essential hypertension     Cerebral aneurysm     BERUMEN (dyspnea on exertion)     KATHYA on CPAP     Status post catheter ablation of atrial fibrillation     Morbid obesity (H)     Revi  e  Physical Examination Review of Systems   w abdirahman  /70 (BP Location: Right arm, Patient Position: Sitting, Cuff Size: Adult Large)   Pulse 60   Resp 16   Ht 1.549 m (5' 1\")   Wt 94.3 kg (208 lb)   BMI 39.30 kg/m    Body mass index is 39.3 kg/m .  Wt Readings from Last 3 Encounters:   02/15/22 94.3 kg (208 lb)   08/05/20 97.5 kg (215 lb)   09/26/19 93.4 kg (206 lb)     General Appearance:   Alert, well-appearing and in no acute distress.   HEENT: Atraumatic, normocephalic.  No scleral icterus, normal conjunctivae; mucous membranes pink and moist.     Chest: Chest symmetric, spine straight.   Lungs:   Respirations unlabored: Lungs are clear to auscultation.   Cardiovascular:   Normal first and second heart sounds with no murmurs, rubs, or gallops.  Regular, regular.   Normal JVD, no edema.       Extremities: No cyanosis or clubbing   Musculoskeletal: Moves all extremities   Skin: Warm, dry, intact.    Neurologic: Mood and affect are appropriate, alert and oriented to person, place, time, and situation     ROS: 10 point ROS neg other than the symptoms noted above in the HPI.     Medical History  Surgical History Family History Social History     No past medical history on file. Past Surgical History:   Procedure Laterality Date     ANKLE SURGERY      X 2     CARDIAC ELECTROPHYSIOLOGY MAPPING AND ABLATION  9/12/17    PVI     EP ABLATION AFLUTTER  9/12/2017    Procedure: EP Ablation Atrial Flutter;  Surgeon: Trevin Silveira MD;  Location: [facility] Lab;  Service:      EP ABLATION PVI Left 9/12/2017    PVI Sept 12, 2017---cryo to 6 PVs and RCTI with SWA     EYE SURGERY      X 2     H OR CATH ABLATION NON-CARDIAC ENDOVASCULAR OPNP       LUMPECTOMY BREAST       LUMPECTOMY BREAST Right 2012     TUBAL " LIGATION      Family History   Problem Relation Age of Onset     Snoring Mother      Acute Myocardial Infarction Father      Breast Cancer Sister      Diabetes Type 2  Brother      Cerebral aneurysm Brother 67.00     Cerebral aneurysm Sister 58.00     No Known Problems Daughter      Chronic Obstructive Pulmonary Disease Sister      No Known Problems Sister      No Known Problems Sister      Coronary Stenting Brother      Coronary Artery Disease Brother 74.00     Hypertension Brother      Alcoholism Brother     History   Smoking Status     Never Smoker   Smokeless Tobacco     Never Used     Social History    Substance and Sexual Activity      Alcohol use: Not on file       Medications  Allergies     Current Outpatient Medications   Medication Sig Dispense Refill     alendronate (FOSAMAX) 70 MG tablet Take 70 mg by mouth once a week       calcium carbonate (SUPER CALCIUM) 1500 (600 Ca) MG tablet Take 600 mg by mouth daily       CARTIA  MG 24 hr capsule Take 1 capsule (240 mg total) by mouth daily. 90 capsule 1     diclofenac (VOLTAREN) 1 % topical gel as needed       lisinopril-hydrochlorothiazide (ZESTORETIC) 20-12.5 MG tablet Take 1 tablet by mouth daily       multivitamin, therapeutic (THERA-VIT) TABS tablet Take 1 tablet by mouth daily       Omega-3 1000 MG capsule Take 1,200 mg by mouth       warfarin ANTICOAGULANT (COUMADIN) 5 MG tablet Take 2.5-5 mg by mouth        No Known Allergies   Medical, surgical, family, social history, and medications were all reviewed and updated as necessary.   Lab Results    Chemistry/lipid CBC Cardiac Enzymes/BNP/TSH/INR   Recent Labs   Lab Test 09/08/21  1009   CHOL 198   HDL 47*      TRIG 223*     Recent Labs   Lab Test 09/08/21  1009 09/16/20  0817 09/04/19  0911    119 107     Recent Labs   Lab Test 09/08/21  1009      POTASSIUM 3.9   CHLORIDE 106   CO2 23   GLC 97   BUN 15   CR 0.80   GFRESTIMATED 74   LISA 9.3     Recent Labs   Lab Test 09/08/21  1009  09/16/20  0817 03/02/20  0508   CR 0.80 0.87 0.76     No results for input(s): A1C in the last 34261 hours.       Recent Labs   Lab Test 03/02/20  0508   WBC 9.7   HGB 13.1   HCT 39.7   MCV 89        Recent Labs   Lab Test 03/02/20  0508 03/01/20 2050 05/08/19  1153   HGB 13.1 14.5 14.3    Recent Labs   Lab Test 03/02/20  0508 03/02/20  0110 03/01/20 2050   TROPONINI <0.01 <0.01 <0.01     No results for input(s): BNP, NTBNPI, NTBNP in the last 31311 hours.  Recent Labs   Lab Test 09/16/20  0817   TSH 2.27     Recent Labs   Lab Test 03/02/20  0508 03/02/20 0110 02/24/19  0639   INR 2.15* 2.03* 2.24*        Total Time- 20 minutes spent on date of encounter doing chart review, history and exam, documentation and further activities as noted above.  This note has been dictated using voice recognition software. Any grammatical, typographical, or context distortions are unintentional and inherent to the software.

## 2022-02-15 NOTE — PROGRESS NOTES
Thank you, Dr. Grimm, for asking the [facility] team to see Ms. Betty Mitchell to evaluate paroxysmal atrial fibrillation.    Assessment/Recommendations     Assessment/Plan:    Diagnoses and all orders for this visit:  Paroxysmal atrial fibrillation (H) with high burden prior to PVI.  No symptoms or ECG evidence of recurrence of A. fib since ablation and is now 4-1/2 years out from procedure.  Essential hypertension and blood pressure at goal.  KATHYA and continues to use CPAP routinely.  Stressed the importance of adherence to the CPAP.    [id number] score of 3 warfarin and on warfarin.  She reports that they listen to her every time she has an INR and never been noted to be in A. fib on physical exam since ablation.  Follow up in clinic with me as needed.  To call if symptoms of recurrence of atrial fibrillation..     History of Present Illness/Subjective     Betty Mitchell is a very pleasant 71 year old female who comes in today for EP follow-up for paroxysmal atrial fibrillation.  Betty Mitchell has a known history of paroxysmal atrial fib which was diagnosed in September 2016.  She had a pretty rapid progression of her atrial fib and prior to PVI she was spending at least half the time out of rhythm. Betty has never had a cardioversion.  On September 12, 2017 Betty had pulmonary vein isolation ablation with Dr. Silveira.  She had cryoablation of pulmonary veins plus right CTI flutter line done.  She has had no symptoms of recurrence of A. fib since.  Betty's medical history is significant for hypertension and KATHYA on CPAP.  Betty reports that she has been more active and noticing more energy and activity tolerance remains the same throughout the last year.  She denies any cardiac symptoms.  She denies any symptoms of recurrence of atrial fibrillation.  She is pleased with how well she has done post PVI.    Cardiographics (reviewed):  March 2019 nuclear stress test  shows:  Conclusion       When compared to the images of 11/14/2016, there has been no significant change.    The left ventricular ejection fraction is 71%.    There is a small to medium sized fixed defect in the apical anterior, anteroseptal segments. This suggests possibly an area of nontransmural infarction.    No evidence of inducible myocardial ischemia on the study.           Echo ARACELI W Bubble [ECH12] 09/11/2017     Narrative 1. Normal left ventricular size and systolic performance with a visually   estimated ejection fraction of 55-60%.   2. There is trace aortic insufficiency.   3. Normal right ventricular size and systolic performance.   4. There is mild left atrial enlargement.  5. No thrombus is detected within the left atrium/left atrial appendage.  6. All 4 pulmonary veins are identified and appear to be in normal   anatomic location.  7. Echo contrast examination was performed using agitated NS as contrast   agent. The right heart opacity was adequate and the left heart was   adequately visualized. There was no evidence of right to left shunt during   spontaneous respiration or following release of Valsalva.      The study was performed using a multiplane adult transducer. 2-D,   colorflow Doppler, and spectral Doppler analysis was performed. Informed   consent was obtained prior to the procedure.  Sedation: fentanyl 100 mcg &   versed 1.0 mg iv. Topical anesthesia was performed with viscous lidocaine   and benzocaine spray. The transducer was introduced without difficulty.   There were no complications of the procedure.       November 2017 2 weeks symptom monitor shows:  Normal event monitor with all sinus and no A. fib seen.         Problem List:  Patient Active Problem List   Diagnosis     Persistent atrial fibrillation (H)     Essential hypertension     Cerebral aneurysm     BERUMEN (dyspnea on exertion)     KATHYA on CPAP     Status post catheter ablation of atrial fibrillation     Morbid obesity (H)  "    Revi  e  Physical Examination Review of Systems   narda gary  /70 (BP Location: Right arm, Patient Position: Sitting, Cuff Size: Adult Large)   Pulse 60   Resp 16   Ht 1.549 m (5' 1\")   Wt 94.3 kg (208 lb)   BMI 39.30 kg/m    Body mass index is 39.3 kg/m .  Wt Readings from Last 3 Encounters:   02/15/22 94.3 kg (208 lb)   08/05/20 97.5 kg (215 lb)   09/26/19 93.4 kg (206 lb)     General Appearance:   Alert, well-appearing and in no acute distress.   HEENT: Atraumatic, normocephalic.  No scleral icterus, normal conjunctivae; mucous membranes pink and moist.     Chest: Chest symmetric, spine straight.   Lungs:   Respirations unlabored: Lungs are clear to auscultation.   Cardiovascular:   Normal first and second heart sounds with no murmurs, rubs, or gallops.  Regular, regular.   Normal JVD, no edema.       Extremities: No cyanosis or clubbing   Musculoskeletal: Moves all extremities   Skin: Warm, dry, intact.    Neurologic: Mood and affect are appropriate, alert and oriented to person, place, time, and situation     ROS: 10 point ROS neg other than the symptoms noted above in the HPI.     Medical History  Surgical History Family History Social History     No past medical history on file. Past Surgical History:   Procedure Laterality Date     ANKLE SURGERY      X 2     CARDIAC ELECTROPHYSIOLOGY MAPPING AND ABLATION  9/12/17    PVI     EP ABLATION AFLUTTER  9/12/2017    Procedure: EP Ablation Atrial Flutter;  Surgeon: Trevin Silveira MD;  Location: [facility] Lab;  Service:      EP ABLATION PVI Left 9/12/2017    PVI Sept 12, 2017---cryo to 6 PVs and RCTI with SWA     EYE SURGERY      X 2     H OR CATH ABLATION NON-CARDIAC ENDOVASCULAR OPNP       LUMPECTOMY BREAST       LUMPECTOMY BREAST Right 2012     TUBAL LIGATION      Family History   Problem Relation Age of Onset     Snoring Mother      Acute Myocardial Infarction Father      Breast Cancer Sister      Diabetes Type 2  Brother      Cerebral " aneurysm Brother 67.00     Cerebral aneurysm Sister 58.00     No Known Problems Daughter      Chronic Obstructive Pulmonary Disease Sister      No Known Problems Sister      No Known Problems Sister      Coronary Stenting Brother      Coronary Artery Disease Brother 74.00     Hypertension Brother      Alcoholism Brother     History   Smoking Status     Never Smoker   Smokeless Tobacco     Never Used     Social History    Substance and Sexual Activity      Alcohol use: Not on file       Medications  Allergies     Current Outpatient Medications   Medication Sig Dispense Refill     alendronate (FOSAMAX) 70 MG tablet Take 70 mg by mouth once a week       calcium carbonate (SUPER CALCIUM) 1500 (600 Ca) MG tablet Take 600 mg by mouth daily       CARTIA  MG 24 hr capsule Take 1 capsule (240 mg total) by mouth daily. 90 capsule 1     diclofenac (VOLTAREN) 1 % topical gel as needed       lisinopril-hydrochlorothiazide (ZESTORETIC) 20-12.5 MG tablet Take 1 tablet by mouth daily       multivitamin, therapeutic (THERA-VIT) TABS tablet Take 1 tablet by mouth daily       Omega-3 1000 MG capsule Take 1,200 mg by mouth       warfarin ANTICOAGULANT (COUMADIN) 5 MG tablet Take 2.5-5 mg by mouth        No Known Allergies   Medical, surgical, family, social history, and medications were all reviewed and updated as necessary.   Lab Results    Chemistry/lipid CBC Cardiac Enzymes/BNP/TSH/INR   Recent Labs   Lab Test 09/08/21  1009   CHOL 198   HDL 47*      TRIG 223*     Recent Labs   Lab Test 09/08/21  1009 09/16/20  0817 09/04/19  0911    119 107     Recent Labs   Lab Test 09/08/21  1009      POTASSIUM 3.9   CHLORIDE 106   CO2 23   GLC 97   BUN 15   CR 0.80   GFRESTIMATED 74   LISA 9.3     Recent Labs   Lab Test 09/08/21  1009 09/16/20  0817 03/02/20  0508   CR 0.80 0.87 0.76     No results for input(s): A1C in the last 55687 hours.       Recent Labs   Lab Test 03/02/20  0508   WBC 9.7   HGB 13.1   HCT 39.7   MCV  89        Recent Labs   Lab Test 03/02/20  0508 03/01/20 2050 05/08/19  1153   HGB 13.1 14.5 14.3    Recent Labs   Lab Test 03/02/20  0508 03/02/20 0110 03/01/20 2050   TROPONINI <0.01 <0.01 <0.01     No results for input(s): BNP, NTBNPI, NTBNP in the last 62726 hours.  Recent Labs   Lab Test 09/16/20  0817   TSH 2.27     Recent Labs   Lab Test 03/02/20 0508 03/02/20 0110 02/24/19  0639   INR 2.15* 2.03* 2.24*          Total Time- 20 minutes spent on date of encounter doing chart review, history and exam, documentation and further activities as noted above.  This note has been dictated using voice recognition software. Any grammatical, typographical, or context distortions are unintentional and inherent to the software.

## 2022-02-15 NOTE — PATIENT INSTRUCTIONS
Betty Mitchell,    It was a pleasure to see you today at the [facility].     My recommendations after this visit include:    Please call if symptoms of recurrence of atrial fibrillation.  To followup with me as needed.      My contact information:  Rolando Martin CNP  After Hours or Scheduling  [phone]  My Nurse---Jayla Santa [phone]

## 2022-09-13 ENCOUNTER — LAB REQUISITION (OUTPATIENT)
Dept: LAB | Facility: CLINIC | Age: 72
End: 2022-09-13

## 2022-09-13 DIAGNOSIS — E78.5 HYPERLIPIDEMIA, UNSPECIFIED: ICD-10-CM

## 2022-09-13 DIAGNOSIS — I10 ESSENTIAL (PRIMARY) HYPERTENSION: ICD-10-CM

## 2022-09-13 LAB
ALBUMIN SERPL BCG-MCNC: 3.9 G/DL (ref 3.5–5.2)
ALP SERPL-CCNC: 66 U/L (ref 35–104)
ALT SERPL W P-5'-P-CCNC: 28 U/L (ref 10–35)
ANION GAP SERPL CALCULATED.3IONS-SCNC: 10 MMOL/L (ref 7–15)
AST SERPL W P-5'-P-CCNC: 30 U/L (ref 10–35)
BILIRUB SERPL-MCNC: 0.4 MG/DL
BUN SERPL-MCNC: 14.5 MG/DL (ref 8–23)
CALCIUM SERPL-MCNC: 9 MG/DL (ref 8.8–10.2)
CHLORIDE SERPL-SCNC: 104 MMOL/L (ref 98–107)
CHOLEST SERPL-MCNC: 193 MG/DL
CREAT SERPL-MCNC: 0.76 MG/DL (ref 0.51–0.95)
DEPRECATED HCO3 PLAS-SCNC: 27 MMOL/L (ref 22–29)
GFR SERPL CREATININE-BSD FRML MDRD: 82 ML/MIN/1.73M2
GLUCOSE SERPL-MCNC: 95 MG/DL (ref 70–99)
HDLC SERPL-MCNC: 45 MG/DL
LDLC SERPL CALC-MCNC: 115 MG/DL
NONHDLC SERPL-MCNC: 148 MG/DL
POTASSIUM SERPL-SCNC: 3.9 MMOL/L (ref 3.4–5.3)
PROT SERPL-MCNC: 6.5 G/DL (ref 6.4–8.3)
SODIUM SERPL-SCNC: 141 MMOL/L (ref 136–145)
TRIGL SERPL-MCNC: 167 MG/DL

## 2022-09-13 PROCEDURE — 80061 LIPID PANEL: CPT | Performed by: FAMILY MEDICINE

## 2022-09-13 PROCEDURE — 82040 ASSAY OF SERUM ALBUMIN: CPT | Performed by: FAMILY MEDICINE

## 2022-09-13 PROCEDURE — 80053 COMPREHEN METABOLIC PANEL: CPT | Performed by: FAMILY MEDICINE

## 2022-10-30 ENCOUNTER — HEALTH MAINTENANCE LETTER (OUTPATIENT)
Age: 72
End: 2022-10-30

## 2023-04-08 ENCOUNTER — HEALTH MAINTENANCE LETTER (OUTPATIENT)
Age: 73
End: 2023-04-08

## 2023-07-12 ENCOUNTER — LAB REQUISITION (OUTPATIENT)
Dept: LAB | Facility: CLINIC | Age: 73
End: 2023-07-12

## 2023-07-12 DIAGNOSIS — M62.838 OTHER MUSCLE SPASM: ICD-10-CM

## 2023-07-12 DIAGNOSIS — M79.89 OTHER SPECIFIED SOFT TISSUE DISORDERS: ICD-10-CM

## 2023-07-12 LAB
ANION GAP SERPL CALCULATED.3IONS-SCNC: 11 MMOL/L (ref 7–15)
BUN SERPL-MCNC: 12.4 MG/DL (ref 8–23)
CALCIUM SERPL-MCNC: 9.3 MG/DL (ref 8.8–10.2)
CHLORIDE SERPL-SCNC: 107 MMOL/L (ref 98–107)
CREAT SERPL-MCNC: 0.79 MG/DL (ref 0.51–0.95)
D DIMER PPP FEU-MCNC: <0.27 UG/ML FEU (ref 0–0.5)
DEPRECATED HCO3 PLAS-SCNC: 25 MMOL/L (ref 22–29)
GFR SERPL CREATININE-BSD FRML MDRD: 78 ML/MIN/1.73M2
GLUCOSE SERPL-MCNC: 103 MG/DL (ref 70–99)
MAGNESIUM SERPL-MCNC: 2.1 MG/DL (ref 1.7–2.3)
POTASSIUM SERPL-SCNC: 4.1 MMOL/L (ref 3.4–5.3)
SODIUM SERPL-SCNC: 143 MMOL/L (ref 136–145)

## 2023-07-12 PROCEDURE — 80048 BASIC METABOLIC PNL TOTAL CA: CPT | Performed by: FAMILY MEDICINE

## 2023-07-12 PROCEDURE — 85379 FIBRIN DEGRADATION QUANT: CPT | Performed by: FAMILY MEDICINE

## 2023-07-12 PROCEDURE — 83735 ASSAY OF MAGNESIUM: CPT | Performed by: FAMILY MEDICINE

## 2023-09-06 ENCOUNTER — TRANSFERRED RECORDS (OUTPATIENT)
Dept: HEALTH INFORMATION MANAGEMENT | Facility: CLINIC | Age: 73
End: 2023-09-06

## 2023-11-08 ENCOUNTER — LAB REQUISITION (OUTPATIENT)
Dept: LAB | Facility: CLINIC | Age: 73
End: 2023-11-08

## 2023-11-08 DIAGNOSIS — I10 ESSENTIAL (PRIMARY) HYPERTENSION: ICD-10-CM

## 2023-11-08 DIAGNOSIS — E78.5 HYPERLIPIDEMIA, UNSPECIFIED: ICD-10-CM

## 2023-11-08 LAB
ALBUMIN SERPL BCG-MCNC: 4 G/DL (ref 3.5–5.2)
ALP SERPL-CCNC: 65 U/L (ref 35–104)
ALT SERPL W P-5'-P-CCNC: 39 U/L (ref 0–50)
ANION GAP SERPL CALCULATED.3IONS-SCNC: 13 MMOL/L (ref 7–15)
AST SERPL W P-5'-P-CCNC: 40 U/L (ref 0–45)
BILIRUB SERPL-MCNC: 0.4 MG/DL
BUN SERPL-MCNC: 12.6 MG/DL (ref 8–23)
CALCIUM SERPL-MCNC: 9.4 MG/DL (ref 8.8–10.2)
CHLORIDE SERPL-SCNC: 106 MMOL/L (ref 98–107)
CHOLEST SERPL-MCNC: 202 MG/DL
CREAT SERPL-MCNC: 0.84 MG/DL (ref 0.51–0.95)
DEPRECATED HCO3 PLAS-SCNC: 23 MMOL/L (ref 22–29)
EGFRCR SERPLBLD CKD-EPI 2021: 73 ML/MIN/1.73M2
GLUCOSE SERPL-MCNC: 104 MG/DL (ref 70–99)
HDLC SERPL-MCNC: 47 MG/DL
LDLC SERPL CALC-MCNC: 117 MG/DL
NONHDLC SERPL-MCNC: 155 MG/DL
POTASSIUM SERPL-SCNC: 4 MMOL/L (ref 3.4–5.3)
PROT SERPL-MCNC: 7.5 G/DL (ref 6.4–8.3)
SODIUM SERPL-SCNC: 142 MMOL/L (ref 135–145)
TRIGL SERPL-MCNC: 191 MG/DL

## 2023-11-08 PROCEDURE — 80061 LIPID PANEL: CPT | Performed by: FAMILY MEDICINE

## 2023-11-08 PROCEDURE — 80053 COMPREHEN METABOLIC PANEL: CPT | Performed by: FAMILY MEDICINE

## 2023-11-16 RX ORDER — CYCLOBENZAPRINE HCL 5 MG
5 TABLET ORAL 3 TIMES DAILY PRN
COMMUNITY

## 2023-11-16 NOTE — PROGRESS NOTES
Planning to discharge home on POD 1 in the morning with her sister staying with her.       11/16/23 2345   Discharge Planning   Patient/Family Anticipates Transition to home with family  (outpatient PT arranged at [facility])   Concerns to be Addressed all concerns addressed in this encounter   Living Arrangements   People in Home alone   Type of Residence Private Residence   Is your private residence a single family home or apartment? Single family home   Number of Stairs, Within Home, Primary none  (2 exterior steps)   Once home, are you able to live on one level? Yes   Which level? Main Level   Bathroom Shower/Tub Tub/Shower unit   Equipment Currently Used at Home shower chair;grab bar, tub/shower;raised toilet seat  (Has a cane at home)   Support System   Support Systems Family Members  (Sister, Carla Meyers)   Do you have someone available to stay with you one or two nights once you are home? Yes   Education   Patient attended total joint pre-op class/received pre-op teaching  email/phone call

## 2023-11-28 ENCOUNTER — ANESTHESIA EVENT (OUTPATIENT)
Dept: SURGERY | Facility: CLINIC | Age: 73
End: 2023-11-28
Payer: COMMERCIAL

## 2023-11-28 NOTE — TREATMENT PLAN
Orthopedic Surgery Pre-Op Plan: Betty Mitchell  pre-op review. This is NOT an H&P   Surgeon: Dr. Hooper   Hospital: [facility]  Name of Surgery: Right Total Hip Arthroplasty  Date of Surgery: 11/29/23  H&P: Completed on 11/8/23 by Dr. Vel Grimm at [facility].   History of ASA, NSAIDS, vitamin and/or herbal supplements, GLP-1 Agonist medication taken within 10 days?: Yes- Omega 3, Multivitamin-patient instructed to hold the supplements/vitamins for 7 days before surgery.  History of blood thinners?: Yes-on chronic anticoagulation with warfarin for atrial fibrillation.  Patient instructed to hold warfarin for 5 days before surgery (take last dose on 11/23/2023, then hold).    Plan:   1) Discharge Plan: Home morning of POD 1 with assist of her sister, Carla, who will be staying with her after surgery. Please see Discharge Planning section near bottom of this note for further details.     2) History of Anesthesia Complications: reports slow to awaken and also nausea/vomiting.  I recommend patient discusses this history with preop nursing and anesthesia on day of surgery.  Would likely benefit from antiemetics and other measures to prevent or minimize nausea/vomiting.    3) Paroxsymal Atrial Fibrillation: S/P PVI ablation 9/2017: Follows with Cardiology at [facility]. Reviewed last Cardiology visit note from 2/15/22: patient doing well. Denies any symptoms of atrial fibrillation recurrence since ablation in 2017. Denies any cardiac symptoms and reports remaining active with no recent changes in activity tolerance.  On chronic anticoagulation with warfarin.  Patient instructed to hold warfarin for 5 days before surgery (take last dose on 11/23/2023, then hold).  After surgery, we will plan to resume warfarin once safe from a bleeding standpoint per Dr. Hooper.     4) Cerebral Aneurysm: Non-ruptured: Stable per most recent MRA of head 6/30/2020. Follows with Neurosurgery at [facility]. Has repeat MRA of  head every 5 years for monitoring.     MRA Head without contrast 6/30/2020:  ANTERIOR CIRCULATION: There is a stable appearing 1-2 mm outpouching along the  superomedial aspect of the right cavernous ICA near the origin of the right  ophthalmic artery which may reflect a stable right ICA aneurysm. The carotid  siphons are patent bilaterally. No other evidence of anterior circulation  aneurysm. No stenosis/occlusion,, or high flow vascular malformation. Partial  fetal origin left posterior cerebral artery, normal variant.    POSTERIOR CIRCULATION: No stenosis/occlusion, aneurysm, or high flow vascular  malformation. Balanced vertebral arteries supply a normal basilar artery.    IMPRESSION:  1.  Stable tiny right cavernous ICA aneurysm as detailed above.    5) Hyperlipidemia: Not on statin.    6) Hypertension: Appears well controlled on diltiazem and lisinopril-hydrochlorothiazide.  Patient instructed to hold lisinopril-hydrochlorothiazide on the morning of surgery but to continue taking diltiazem.    7) Obstructive Sleep Apnea: On CPAP.  Patient reminded to bring CPAP to the hospital and use it whenever sleeping or napping.    8) Obesity: BMI 39.1, Wt: 207 lbs. I recommend continued efforts at safe weight loss following recovery from surgery. If patient is interested in further assistance with weight loss, please consider referral to [facility]. They offer both non-surgical and surgical evidence-based weight loss strategies. Call [phone] to schedule a consultation to learn more.      Patient appears medically optimized for upcoming surgery. I would recommend Hospitalist Consult to assist with medical management. Please call me below with any questions on this patient.       Review of Systems Notable for: Paroxysmal atrial fibrillation-s/p PVI ablation 9/20/2017-on warfarin, cerebral [id number], hyperlipidemia, hypertension, obstructive sleep  apnea-on CPAP, obesity.    Past Medical History:   Past Medical History:   Diagnosis Date    Antiplatelet or antithrombotic long-term use     Arthritis     Atrial fibrillation (H)     Cerebral aneurysm, nonruptured     Coronary artery disease     Heart murmur 06/2023    Hyperlipidemia     Hypertension     Obese     Osteopenia     PONV (postoperative nausea and vomiting)     Sleep apnea     CPAP    Slow to wake up after anesthesia      Past Surgical History:   Procedure Laterality Date    CARDIAC ELECTROPHYSIOLOGY MAPPING AND ABLATION  09/12/2017    PVI    CATARACT EXTRACTION Left     EP ABLATION AFLUTTER  09/12/2017    Procedure: EP Ablation Atrial Flutter;  Surgeon: Trevin Silveira MD;  Location: [facility] Cath Lab;  Service:     EP ABLATION PVI Left 09/12/2017    PVI Sept 12, 2017---cryo to 6 PVs and RCTI with SWA    EYE SURGERY Left     Vitrectomy    FOOT SURGERY Right     x2    LUMPECTOMY BREAST Right 01/01/2012    TUBAL LIGATION  1984       Current Medications:  Patient's Medications   New Prescriptions    No medications on file   Previous Medications    ALENDRONATE (FOSAMAX) 70 MG TABLET    Take 70 mg by mouth once a week    CALCIUM CARBONATE (SUPER CALCIUM) 1500 (600 CA) MG TABLET    Take 600 mg by mouth daily    CYCLOBENZAPRINE (FLEXERIL) 5 MG TABLET    Take 5 mg by mouth 3 times daily as needed for muscle spasms    DICLOFENAC (VOLTAREN) 1 % TOPICAL GEL    Apply 2 g topically 4 times daily as needed for moderate pain    DILTIAZEM ER COATED BEADS (CARDIZEM CD/CARTIA XT) 240 MG 24 HR CAPSULE    Take 1 capsule by mouth daily.    LISINOPRIL-HYDROCHLOROTHIAZIDE (ZESTORETIC) 20-12.5 MG TABLET    Take 1 tablet by mouth daily    MULTIVITAMIN, THERAPEUTIC (THERA-VIT) TABS TABLET    Take 1 tablet by mouth daily Stopping 11/22/23 before surgery    OMEGA-3 1000 MG CAPSULE    Take 1,200 mg by mouth daily Stopping 11/22/23 before surgery    WARFARIN ANTICOAGULANT (COUMADIN) 5 MG TABLET    Take 2.5-5 mg by mouth daily  Everyday except Mondays. 2.5 mg on Mondays.  Last dose 11/23/23 before surgery.   Modified Medications    No medications on file   Discontinued Medications    No medications on file       ALLERGIES:  No Known Allergies    Social History  Social History     Tobacco Use    Smoking status: Never    Smokeless tobacco: Never   Substance Use Topics    Alcohol use: Yes     Comment: occasional    Drug use: Never       Any Abnormal Recent Diagnostics? Yes  INR 2.3 on 11/8/2023: On chronic anticoagulation with warfarin.  Patient will hold warfarin 5 days before surgery.  We will check INR on day of surgery.  Blood glucose 104 on 11/8/2023: No known history of prediabetes or diabetes.  We will monitor BG's.  Goal BG <180.    Discharge Planning:   Planning to discharge home on POD 1 in the morning with her sister staying with her.        11/16/23 4104   Discharge Planning   Patient/Family Anticipates Transition to home with family  (outpatient PT arranged at [facility])   Concerns to be Addressed all concerns addressed in this encounter   Living Arrangements   People in Home alone   Type of Residence Private Residence   Is your private residence a single family home or apartment? Single family home   Number of Stairs, Within Home, Primary none  (2 exterior steps)   Once home, are you able to live on one level? Yes   Which level? Main Level   Bathroom Shower/Tub Tub/Shower unit   Equipment Currently Used at Home shower chair;grab bar, tub/shower;raised toilet seat  (Has a cane at home)   Support System   Support Systems Family Members  (Sister, Carla Meyers)   Do you have someone available to stay with you one or two nights once you are home? Yes       CARLOS A Su, CNP   Advanced Practice Nurse Navigator- Orthopedics  [facility]   Phone: [phone]

## 2023-11-29 ENCOUNTER — ANESTHESIA (OUTPATIENT)
Dept: SURGERY | Facility: CLINIC | Age: 73
End: 2023-11-29
Payer: COMMERCIAL

## 2023-11-29 ENCOUNTER — APPOINTMENT (OUTPATIENT)
Dept: RADIOLOGY | Facility: CLINIC | Age: 73
End: 2023-11-29
Attending: ORTHOPAEDIC SURGERY
Payer: COMMERCIAL

## 2023-11-29 ENCOUNTER — APPOINTMENT (OUTPATIENT)
Dept: PHYSICAL THERAPY | Facility: CLINIC | Age: 73
End: 2023-11-29
Attending: ORTHOPAEDIC SURGERY
Payer: COMMERCIAL

## 2023-11-29 ENCOUNTER — HOSPITAL ENCOUNTER (OUTPATIENT)
Facility: CLINIC | Age: 73
Discharge: HOME | End: 2023-12-01
Attending: ORTHOPAEDIC SURGERY | Admitting: ORTHOPAEDIC SURGERY
Payer: COMMERCIAL

## 2023-11-29 DIAGNOSIS — M16.11 PRIMARY OSTEOARTHRITIS OF RIGHT HIP: Primary | ICD-10-CM

## 2023-11-29 LAB — INR PPP: 1.14 (ref 0.85–1.15)

## 2023-11-29 PROCEDURE — 258N000003 HC RX IP 258 OP 636: Performed by: STUDENT IN AN ORGANIZED HEALTH CARE EDUCATION/TRAINING PROGRAM

## 2023-11-29 PROCEDURE — 99232 SBSQ HOSP IP/OBS MODERATE 35: CPT | Performed by: INTERNAL MEDICINE

## 2023-11-29 PROCEDURE — 250N000009 HC RX 250: Performed by: NURSE ANESTHETIST, CERTIFIED REGISTERED

## 2023-11-29 PROCEDURE — 94660 CPAP INITIATION&MGMT: CPT

## 2023-11-29 PROCEDURE — 258N000003 HC RX IP 258 OP 636: Performed by: NURSE ANESTHETIST, CERTIFIED REGISTERED

## 2023-11-29 PROCEDURE — 258N000001 HC RX 258: Performed by: ORTHOPAEDIC SURGERY

## 2023-11-29 PROCEDURE — 360N000077 HC SURGERY LEVEL 4, PER MIN: Performed by: ORTHOPAEDIC SURGERY

## 2023-11-29 PROCEDURE — 85610 PROTHROMBIN TIME: CPT | Performed by: NURSE PRACTITIONER

## 2023-11-29 PROCEDURE — 97110 THERAPEUTIC EXERCISES: CPT | Mod: GP

## 2023-11-29 PROCEDURE — 999N000157 HC STATISTIC RCP TIME EA 10 MIN

## 2023-11-29 PROCEDURE — C1713 ANCHOR/SCREW BN/BN,TIS/BN: HCPCS | Performed by: ORTHOPAEDIC SURGERY

## 2023-11-29 PROCEDURE — C1776 JOINT DEVICE (IMPLANTABLE): HCPCS | Performed by: ORTHOPAEDIC SURGERY

## 2023-11-29 PROCEDURE — 250N000009 HC RX 250: Performed by: ORTHOPAEDIC SURGERY

## 2023-11-29 PROCEDURE — 999N000065 XR PELVIS PORT 1/2 VIEWS

## 2023-11-29 PROCEDURE — 250N000011 HC RX IP 250 OP 636: Performed by: PHYSICIAN ASSISTANT

## 2023-11-29 PROCEDURE — 250N000013 HC RX MED GY IP 250 OP 250 PS 637: Performed by: INTERNAL MEDICINE

## 2023-11-29 PROCEDURE — 36415 COLL VENOUS BLD VENIPUNCTURE: CPT | Performed by: NURSE PRACTITIONER

## 2023-11-29 PROCEDURE — 272N000001 HC OR GENERAL SUPPLY STERILE: Performed by: ORTHOPAEDIC SURGERY

## 2023-11-29 PROCEDURE — 97116 GAIT TRAINING THERAPY: CPT | Mod: GP

## 2023-11-29 PROCEDURE — 97161 PT EVAL LOW COMPLEX 20 MIN: CPT | Mod: GP

## 2023-11-29 PROCEDURE — 250N000013 HC RX MED GY IP 250 OP 250 PS 637: Performed by: ORTHOPAEDIC SURGERY

## 2023-11-29 PROCEDURE — 250N000011 HC RX IP 250 OP 636: Performed by: ORTHOPAEDIC SURGERY

## 2023-11-29 PROCEDURE — 258N000003 HC RX IP 258 OP 636: Performed by: ORTHOPAEDIC SURGERY

## 2023-11-29 PROCEDURE — 370N000017 HC ANESTHESIA TECHNICAL FEE, PER MIN: Performed by: ORTHOPAEDIC SURGERY

## 2023-11-29 PROCEDURE — 250N000013 HC RX MED GY IP 250 OP 250 PS 637: Performed by: PHYSICIAN ASSISTANT

## 2023-11-29 PROCEDURE — 250N000009 HC RX 250: Performed by: PHYSICIAN ASSISTANT

## 2023-11-29 PROCEDURE — 250N000011 HC RX IP 250 OP 636: Mod: JZ | Performed by: NURSE ANESTHETIST, CERTIFIED REGISTERED

## 2023-11-29 PROCEDURE — 999N000141 HC STATISTIC PRE-PROCEDURE NURSING ASSESSMENT: Performed by: ORTHOPAEDIC SURGERY

## 2023-11-29 PROCEDURE — 710N000010 HC RECOVERY PHASE 1, LEVEL 2, PER MIN: Performed by: ORTHOPAEDIC SURGERY

## 2023-11-29 DEVICE — IMP SCR ACET SNN SPHERICAL HEAD 6.5X15MM 71332515: Type: IMPLANTABLE DEVICE | Site: HIP | Status: FUNCTIONAL

## 2023-11-29 DEVICE — IMPLANTABLE DEVICE: Type: IMPLANTABLE DEVICE | Site: HIP | Status: FUNCTIONAL

## 2023-11-29 DEVICE — IMP SCR ACET SNN SPHERICAL HEAD 6.5X25MM 71332525: Type: IMPLANTABLE DEVICE | Site: HIP | Status: FUNCTIONAL

## 2023-11-29 DEVICE — IMP SCR ACET SNN SPHERICAL HEAD 6.5X20MM 71332520: Type: IMPLANTABLE DEVICE | Site: HIP | Status: FUNCTIONAL

## 2023-11-29 DEVICE — GUIDE PIN LONG ACETABULAR: Type: IMPLANTABLE DEVICE | Site: HIP | Status: FUNCTIONAL

## 2023-11-29 DEVICE — IMP SHELL SNR ACET R3 3H 48MM 71335548: Type: IMPLANTABLE DEVICE | Site: HIP | Status: FUNCTIONAL

## 2023-11-29 RX ORDER — HYDROMORPHONE HCL IN WATER/PF 6 MG/30 ML
0.2 PATIENT CONTROLLED ANALGESIA SYRINGE INTRAVENOUS
Status: DISCONTINUED | OUTPATIENT
Start: 2023-11-29 | End: 2023-12-01 | Stop reason: HOSPADM

## 2023-11-29 RX ORDER — OXYCODONE HYDROCHLORIDE 5 MG/1
5 TABLET ORAL EVERY 4 HOURS PRN
Status: DISCONTINUED | OUTPATIENT
Start: 2023-11-29 | End: 2023-12-01 | Stop reason: HOSPADM

## 2023-11-29 RX ORDER — EPHEDRINE SULFATE 50 MG/ML
INJECTION, SOLUTION INTRAMUSCULAR; INTRAVENOUS; SUBCUTANEOUS PRN
Status: DISCONTINUED | OUTPATIENT
Start: 2023-11-29 | End: 2023-11-29

## 2023-11-29 RX ORDER — BISACODYL 10 MG
10 SUPPOSITORY, RECTAL RECTAL DAILY PRN
Status: DISCONTINUED | OUTPATIENT
Start: 2023-11-29 | End: 2023-12-01 | Stop reason: HOSPADM

## 2023-11-29 RX ORDER — FENTANYL CITRATE 50 UG/ML
50 INJECTION, SOLUTION INTRAMUSCULAR; INTRAVENOUS EVERY 5 MIN PRN
Status: DISCONTINUED | OUTPATIENT
Start: 2023-11-29 | End: 2023-11-29 | Stop reason: HOSPADM

## 2023-11-29 RX ORDER — ONDANSETRON 4 MG/1
4 TABLET, ORALLY DISINTEGRATING ORAL EVERY 30 MIN PRN
Status: DISCONTINUED | OUTPATIENT
Start: 2023-11-29 | End: 2023-11-29 | Stop reason: HOSPADM

## 2023-11-29 RX ORDER — CALCIUM CARBONATE 500 MG/1
500 TABLET, CHEWABLE ORAL 4 TIMES DAILY PRN
Status: DISCONTINUED | OUTPATIENT
Start: 2023-11-29 | End: 2023-12-01 | Stop reason: HOSPADM

## 2023-11-29 RX ORDER — HYDROMORPHONE HCL IN WATER/PF 6 MG/30 ML
0.4 PATIENT CONTROLLED ANALGESIA SYRINGE INTRAVENOUS
Status: DISCONTINUED | OUTPATIENT
Start: 2023-11-29 | End: 2023-12-01 | Stop reason: HOSPADM

## 2023-11-29 RX ORDER — ONDANSETRON 2 MG/ML
4 INJECTION INTRAMUSCULAR; INTRAVENOUS EVERY 6 HOURS PRN
Status: DISCONTINUED | OUTPATIENT
Start: 2023-11-29 | End: 2023-12-01 | Stop reason: HOSPADM

## 2023-11-29 RX ORDER — ACETAMINOPHEN 325 MG/1
975 TABLET ORAL EVERY 8 HOURS
Qty: 27 TABLET | Refills: 0 | Status: DISCONTINUED | OUTPATIENT
Start: 2023-11-29 | End: 2023-12-01 | Stop reason: HOSPADM

## 2023-11-29 RX ORDER — LIDOCAINE 40 MG/G
CREAM TOPICAL
Status: DISCONTINUED | OUTPATIENT
Start: 2023-11-29 | End: 2023-12-01 | Stop reason: HOSPADM

## 2023-11-29 RX ORDER — OXYCODONE HYDROCHLORIDE 5 MG/1
10 TABLET ORAL EVERY 4 HOURS PRN
Status: DISCONTINUED | OUTPATIENT
Start: 2023-11-29 | End: 2023-12-01 | Stop reason: HOSPADM

## 2023-11-29 RX ORDER — NALOXONE HYDROCHLORIDE 0.4 MG/ML
0.4 INJECTION, SOLUTION INTRAMUSCULAR; INTRAVENOUS; SUBCUTANEOUS
Status: DISCONTINUED | OUTPATIENT
Start: 2023-11-29 | End: 2023-12-01 | Stop reason: HOSPADM

## 2023-11-29 RX ORDER — MULTIVITAMIN,THERAPEUTIC
1 TABLET ORAL DAILY
Status: DISCONTINUED | OUTPATIENT
Start: 2023-11-30 | End: 2023-12-01 | Stop reason: HOSPADM

## 2023-11-29 RX ORDER — HYDROMORPHONE HCL IN WATER/PF 6 MG/30 ML
0.2 PATIENT CONTROLLED ANALGESIA SYRINGE INTRAVENOUS EVERY 5 MIN PRN
Status: DISCONTINUED | OUTPATIENT
Start: 2023-11-29 | End: 2023-11-29 | Stop reason: HOSPADM

## 2023-11-29 RX ORDER — NALOXONE HYDROCHLORIDE 0.4 MG/ML
0.2 INJECTION, SOLUTION INTRAMUSCULAR; INTRAVENOUS; SUBCUTANEOUS
Status: DISCONTINUED | OUTPATIENT
Start: 2023-11-29 | End: 2023-12-01 | Stop reason: HOSPADM

## 2023-11-29 RX ORDER — PROCHLORPERAZINE MALEATE 5 MG
5 TABLET ORAL EVERY 6 HOURS PRN
Status: DISCONTINUED | OUTPATIENT
Start: 2023-11-29 | End: 2023-12-01 | Stop reason: HOSPADM

## 2023-11-29 RX ORDER — DEXAMETHASONE SODIUM PHOSPHATE 10 MG/ML
INJECTION, SOLUTION INTRAMUSCULAR; INTRAVENOUS PRN
Status: DISCONTINUED | OUTPATIENT
Start: 2023-11-29 | End: 2023-11-29

## 2023-11-29 RX ORDER — CEFAZOLIN SODIUM/WATER 2 G/20 ML
2 SYRINGE (ML) INTRAVENOUS SEE ADMIN INSTRUCTIONS
Status: DISCONTINUED | OUTPATIENT
Start: 2023-11-29 | End: 2023-11-29 | Stop reason: HOSPADM

## 2023-11-29 RX ORDER — DILTIAZEM HYDROCHLORIDE 120 MG/1
240 CAPSULE, COATED, EXTENDED RELEASE ORAL AT BEDTIME
Status: DISCONTINUED | OUTPATIENT
Start: 2023-11-29 | End: 2023-12-01 | Stop reason: HOSPADM

## 2023-11-29 RX ORDER — ALBUTEROL SULFATE 90 UG/1
2 AEROSOL, METERED RESPIRATORY (INHALATION) EVERY 6 HOURS PRN
Status: DISCONTINUED | OUTPATIENT
Start: 2023-11-29 | End: 2023-12-01 | Stop reason: HOSPADM

## 2023-11-29 RX ORDER — MAGNESIUM HYDROXIDE 1200 MG/15ML
LIQUID ORAL PRN
Status: DISCONTINUED | OUTPATIENT
Start: 2023-11-29 | End: 2023-11-29 | Stop reason: HOSPADM

## 2023-11-29 RX ORDER — ALBUTEROL SULFATE 90 UG/1
2 AEROSOL, METERED RESPIRATORY (INHALATION) EVERY 6 HOURS PRN
COMMUNITY

## 2023-11-29 RX ORDER — ACETAMINOPHEN 325 MG/1
650 TABLET ORAL EVERY 4 HOURS PRN
Status: DISCONTINUED | OUTPATIENT
Start: 2023-12-02 | End: 2023-12-01 | Stop reason: HOSPADM

## 2023-11-29 RX ORDER — LIDOCAINE 40 MG/G
CREAM TOPICAL
Status: DISCONTINUED | OUTPATIENT
Start: 2023-11-29 | End: 2023-11-29 | Stop reason: HOSPADM

## 2023-11-29 RX ORDER — SODIUM CHLORIDE, SODIUM LACTATE, POTASSIUM CHLORIDE, CALCIUM CHLORIDE 600; 310; 30; 20 MG/100ML; MG/100ML; MG/100ML; MG/100ML
INJECTION, SOLUTION INTRAVENOUS CONTINUOUS
Status: DISCONTINUED | OUTPATIENT
Start: 2023-11-29 | End: 2023-11-29 | Stop reason: HOSPADM

## 2023-11-29 RX ORDER — ONDANSETRON 4 MG/1
4 TABLET, ORALLY DISINTEGRATING ORAL EVERY 6 HOURS PRN
Status: DISCONTINUED | OUTPATIENT
Start: 2023-11-29 | End: 2023-12-01 | Stop reason: HOSPADM

## 2023-11-29 RX ORDER — PROPOFOL 10 MG/ML
INJECTION, EMULSION INTRAVENOUS CONTINUOUS PRN
Status: DISCONTINUED | OUTPATIENT
Start: 2023-11-29 | End: 2023-11-29

## 2023-11-29 RX ORDER — FENTANYL CITRATE 0.05 MG/ML
INJECTION, SOLUTION INTRAMUSCULAR; INTRAVENOUS PRN
Status: DISCONTINUED | OUTPATIENT
Start: 2023-11-29 | End: 2023-11-29

## 2023-11-29 RX ORDER — POLYETHYLENE GLYCOL 3350 17 G/17G
17 POWDER, FOR SOLUTION ORAL DAILY
Status: DISCONTINUED | OUTPATIENT
Start: 2023-11-30 | End: 2023-12-01 | Stop reason: HOSPADM

## 2023-11-29 RX ORDER — CEFAZOLIN SODIUM/WATER 2 G/20 ML
2 SYRINGE (ML) INTRAVENOUS
Status: COMPLETED | OUTPATIENT
Start: 2023-11-29 | End: 2023-11-29

## 2023-11-29 RX ORDER — HYDROXYZINE HYDROCHLORIDE 10 MG/1
10 TABLET, FILM COATED ORAL EVERY 6 HOURS PRN
Status: DISCONTINUED | OUTPATIENT
Start: 2023-11-29 | End: 2023-12-01 | Stop reason: HOSPADM

## 2023-11-29 RX ORDER — CEFAZOLIN SODIUM 2 G/100ML
2 INJECTION, SOLUTION INTRAVENOUS EVERY 8 HOURS
Qty: 200 ML | Refills: 0 | Status: COMPLETED | OUTPATIENT
Start: 2023-11-29 | End: 2023-11-30

## 2023-11-29 RX ORDER — LISINOPRIL AND HYDROCHLOROTHIAZIDE 12.5; 2 MG/1; MG/1
1 TABLET ORAL DAILY
Status: DISCONTINUED | OUTPATIENT
Start: 2023-11-30 | End: 2023-12-01 | Stop reason: HOSPADM

## 2023-11-29 RX ORDER — GLYCOPYRROLATE 0.2 MG/ML
INJECTION, SOLUTION INTRAMUSCULAR; INTRAVENOUS PRN
Status: DISCONTINUED | OUTPATIENT
Start: 2023-11-29 | End: 2023-11-29

## 2023-11-29 RX ORDER — HYDROMORPHONE HCL IN WATER/PF 6 MG/30 ML
0.4 PATIENT CONTROLLED ANALGESIA SYRINGE INTRAVENOUS EVERY 5 MIN PRN
Status: DISCONTINUED | OUTPATIENT
Start: 2023-11-29 | End: 2023-11-29 | Stop reason: HOSPADM

## 2023-11-29 RX ORDER — FENTANYL CITRATE 50 UG/ML
25 INJECTION, SOLUTION INTRAMUSCULAR; INTRAVENOUS EVERY 5 MIN PRN
Status: DISCONTINUED | OUTPATIENT
Start: 2023-11-29 | End: 2023-11-29 | Stop reason: HOSPADM

## 2023-11-29 RX ORDER — TRANEXAMIC ACID 650 MG/1
1950 TABLET ORAL ONCE
Status: COMPLETED | OUTPATIENT
Start: 2023-11-29 | End: 2023-11-29

## 2023-11-29 RX ORDER — SODIUM CHLORIDE, SODIUM LACTATE, POTASSIUM CHLORIDE, CALCIUM CHLORIDE 600; 310; 30; 20 MG/100ML; MG/100ML; MG/100ML; MG/100ML
INJECTION, SOLUTION INTRAVENOUS CONTINUOUS
Status: DISCONTINUED | OUTPATIENT
Start: 2023-11-29 | End: 2023-12-01 | Stop reason: HOSPADM

## 2023-11-29 RX ORDER — AMOXICILLIN 250 MG
1 CAPSULE ORAL 2 TIMES DAILY
Status: DISCONTINUED | OUTPATIENT
Start: 2023-11-29 | End: 2023-12-01 | Stop reason: HOSPADM

## 2023-11-29 RX ORDER — METHOCARBAMOL 500 MG/1
500 TABLET, FILM COATED ORAL EVERY 6 HOURS PRN
Status: DISCONTINUED | OUTPATIENT
Start: 2023-11-29 | End: 2023-12-01 | Stop reason: HOSPADM

## 2023-11-29 RX ORDER — ACETAMINOPHEN 500 MG
1000 TABLET ORAL EVERY 8 HOURS
Qty: 100 TABLET | Refills: 0 | Status: CANCELLED | OUTPATIENT
Start: 2023-11-29

## 2023-11-29 RX ORDER — ONDANSETRON 2 MG/ML
4 INJECTION INTRAMUSCULAR; INTRAVENOUS EVERY 30 MIN PRN
Status: DISCONTINUED | OUTPATIENT
Start: 2023-11-29 | End: 2023-11-29 | Stop reason: HOSPADM

## 2023-11-29 RX ORDER — ONDANSETRON 2 MG/ML
INJECTION INTRAMUSCULAR; INTRAVENOUS PRN
Status: DISCONTINUED | OUTPATIENT
Start: 2023-11-29 | End: 2023-11-29

## 2023-11-29 RX ADMIN — Medication 5 MG: at 09:04

## 2023-11-29 RX ADMIN — DEXAMETHASONE SODIUM PHOSPHATE 10 MG: 10 INJECTION, SOLUTION INTRAMUSCULAR; INTRAVENOUS at 08:17

## 2023-11-29 RX ADMIN — PROPOFOL 100 MCG/KG/MIN: 10 INJECTION, EMULSION INTRAVENOUS at 08:25

## 2023-11-29 RX ADMIN — Medication 5 MG: at 09:48

## 2023-11-29 RX ADMIN — WARFARIN SODIUM 7 MG: 5 TABLET ORAL at 17:28

## 2023-11-29 RX ADMIN — MIDAZOLAM 2 MG: 1 INJECTION INTRAMUSCULAR; INTRAVENOUS at 08:35

## 2023-11-29 RX ADMIN — TRANEXAMIC ACID 1950 MG: 650 TABLET ORAL at 06:48

## 2023-11-29 RX ADMIN — ONDANSETRON 4 MG: 2 INJECTION INTRAMUSCULAR; INTRAVENOUS at 08:15

## 2023-11-29 RX ADMIN — ONDANSETRON 4 MG: 4 TABLET, ORALLY DISINTEGRATING ORAL at 17:53

## 2023-11-29 RX ADMIN — GLYCOPYRROLATE 0.2 MG: 0.2 INJECTION INTRAMUSCULAR; INTRAVENOUS at 08:35

## 2023-11-29 RX ADMIN — SODIUM CHLORIDE, POTASSIUM CHLORIDE, SODIUM LACTATE AND CALCIUM CHLORIDE: 600; 310; 30; 20 INJECTION, SOLUTION INTRAVENOUS at 13:35

## 2023-11-29 RX ADMIN — ACETAMINOPHEN 975 MG: 325 TABLET ORAL at 13:34

## 2023-11-29 RX ADMIN — OXYCODONE HYDROCHLORIDE 10 MG: 5 TABLET ORAL at 19:44

## 2023-11-29 RX ADMIN — ACETAMINOPHEN 975 MG: 325 TABLET ORAL at 21:00

## 2023-11-29 RX ADMIN — PHENYLEPHRINE HYDROCHLORIDE 0.5 MCG/KG/MIN: 10 INJECTION INTRAVENOUS at 08:33

## 2023-11-29 RX ADMIN — DILTIAZEM HYDROCHLORIDE 240 MG: 120 CAPSULE, EXTENDED RELEASE ORAL at 20:58

## 2023-11-29 RX ADMIN — OXYCODONE HYDROCHLORIDE 5 MG: 5 TABLET ORAL at 12:49

## 2023-11-29 RX ADMIN — SODIUM CHLORIDE, POTASSIUM CHLORIDE, SODIUM LACTATE AND CALCIUM CHLORIDE: 600; 310; 30; 20 INJECTION, SOLUTION INTRAVENOUS at 06:48

## 2023-11-29 RX ADMIN — SODIUM CHLORIDE, POTASSIUM CHLORIDE, SODIUM LACTATE AND CALCIUM CHLORIDE: 600; 310; 30; 20 INJECTION, SOLUTION INTRAVENOUS at 21:03

## 2023-11-29 RX ADMIN — Medication 5 MG: at 10:00

## 2023-11-29 RX ADMIN — Medication 10 MG: at 08:44

## 2023-11-29 RX ADMIN — PHENYLEPHRINE HYDROCHLORIDE 400 MCG: 10 INJECTION INTRAVENOUS at 08:32

## 2023-11-29 RX ADMIN — CEFAZOLIN SODIUM 2 G: 2 INJECTION, SOLUTION INTRAVENOUS at 17:22

## 2023-11-29 RX ADMIN — FENTANYL CITRATE 100 MCG: 0.05 INJECTION, SOLUTION INTRAMUSCULAR; INTRAVENOUS at 08:11

## 2023-11-29 RX ADMIN — Medication 2 G: at 08:08

## 2023-11-29 RX ADMIN — SODIUM CHLORIDE, POTASSIUM CHLORIDE, SODIUM LACTATE AND CALCIUM CHLORIDE: 600; 310; 30; 20 INJECTION, SOLUTION INTRAVENOUS at 09:30

## 2023-11-29 RX ADMIN — SENNOSIDES AND DOCUSATE SODIUM 1 TABLET: 8.6; 5 TABLET ORAL at 20:58

## 2023-11-29 ASSESSMENT — ACTIVITIES OF DAILY LIVING (ADL)
ADLS_ACUITY_SCORE: 22
ADLS_ACUITY_SCORE: 20
ADLS_ACUITY_SCORE: 22

## 2023-11-29 NOTE — PROGRESS NOTES
11/29/23 1536   Appointment Info   Signing Clinician's Name / Credentials (PT) Claude Nieves, PT   Quick Adds   Quick Adds Certification   Living Environment   People in Home alone   Current Living Arrangements house   Home Accessibility stairs to enter home;stairs within home   Number of Stairs, Main Entrance 2   Stair Railings, Main Entrance railings safe and in good condition   Number of Stairs, Within Home, Primary none   Living Environment Comments sister will stay with her   Self-Care   Usual Activity Tolerance good   Equipment Currently Used at Home none   Fall history within last six months no   General Information   Onset of Illness/Injury or Date of Surgery 11/29/23   Pertinent History of Current Problem (include personal factors and/or comorbidities that impact the POC) s/p R AKIKO   Existing Precautions/Restrictions no hip IR;no hip ADD past midline   Weight-Bearing Status - RLE (S)  partial weight-bearing (% in comments);other (see comments)  (50%)   Cognition   Affect/Mental Status (Cognition) WFL   Range of Motion (ROM)   ROM Comment decreased ROM s/p R AKIKO   Transfers   Transfers sit-stand transfer   Sit-Stand Transfer   Sit-Stand [place] (Transfers) contact guard;verbal cues   Assistive Device (Sit-Stand Transfers) walker, front-wheeled   Gait/Stairs (Locomotion)   [place] Level (Gait) contact guard;verbal cues   Assistive Device (Gait) walker, front-wheeled   Distance in Feet (Gait) 20   Pattern (Gait) step-to   Deviations/Abnormal Patterns (Gait) gait speed decreased;brittney decreased;stride length decreased   Balance   Balance no deficits were identified   Clinical Impression   Criteria for Skilled Therapeutic Intervention Yes, treatment indicated   PT Diagnosis (PT) impaired functional mobility   Influenced by the following impairments pain, decreased ROM, impaired balance, decreased strength   Functional limitations due to impairments gait, stairs, transfers   Clinical Presentation  (PT Evaluation Complexity) stable   Clinical Presentation Rationale pt presents as medically diagnosed   Clinical Decision Making (Complexity) low complexity   Planned Therapy Interventions (PT) balance training;bed mobility training;gait training;home exercise program;patient/family education;stair training;transfer training;motor coordination training   Risk & Benefits of therapy have been explained care plan/treatment goals reviewed;patient   PT Total Evaluation Time   PT Eval, Low Complexity Minutes (59033) 10   Therapy Certification   Start of care date 11/29/23   Certification date from 11/29/23   Certification date to 12/29/23   Physical Therapy Goals   PT Frequency Daily   PT Predicted Duration/Target Date for Goal Attainment 12/01/23   PT Goals PT Goal 1;Gait;Transfers;Stairs   PT: Transfers Modified independent;Sit to/from stand;Assistive device;Within precautions   PT: Gait Modified independent;Rolling walker;Within precautions;25 feet   PT: Stairs 2 stairs;Minimal assist;Rail on right;Within precautions   PT: Goal 1 Independent with written HEP for LE strengthening and ROM   Interventions   Interventions Quick Adds Therapeutic Procedure;Therapeutic Activity;Gait Training   Therapeutic Procedure/Exercise   Ther. Procedure: strength, endurance, ROM, flexibillity Minutes (36901) 15   Treatment Detail/Skilled Intervention AKIKO protocol therex x10 reps, cueing for technique,   Therapeutic Activity   Treatment Detail/Skilled Intervention sit to/from stand, cueing for safe hand placement and LE positioning,   Gait Training   Gait Training Minutes (43495) 24   Symptoms Noted During/After Treatment (Gait Training) none   Treatment Detail/Skilled Intervention patient practicing with use of scale to get feel for 50% weight bearing, ambulated slow stable gait, vc for step to gait. Rolled paitent down to stairs in chair, demonstrated options for getting up/down her 2 stairs.  Patient then up/down 4 steps with  bilateral rails and appears to be maintaining PWB status.  Patient will have brother help her and sister to get into home   Distance in Feet 25, 10, 10   [place] Level (Gait Training) stand-by assist   Physical Assistance Level (Gait Training) 1 person assist;verbal cues   Weight Bearing (Gait Training) weight-bearing as tolerated   Assistive Device (Gait Training) rolling walker   Pattern Analysis (Gait Training) swing-through gait   Gait Analysis Deviations decreased brittney;decreased step length   Impairments (Gait Analysis/Training) pain;strength decreased   Stair Railings present on right side   Physical Assist/Nonphysical Assist (Stairs) 1 person assist   Level of [place] (Stairs) minimum assist (75% patient effort)   PT Discharge Planning   PT Plan progress per AKIKO protocol   PT Discharge Recommendation (DC Rec) other (see comments)   PT Rationale for DC Rec defer to ortho   PT Brief overview of current status SBA for gait/transfers, limited distance due to PWB status   PT Equipment Needed at Discharge cane, straight;walker, rolling   Total Session Time   Timed Code Treatment Minutes 39   Total Session Time (sum of timed and untimed services) 49   [facility]  OUTPATIENT PHYSICAL THERAPY EVALUATION  PLAN OF TREATMENT FOR OUTPATIENT REHABILITATION  (COMPLETE FOR INITIAL CLAIMS ONLY)  Patient's Last Name, First Name, M.I.  YOB: 1950  Betty Mitchell                        Provider's Name  [facility] Medical Record No.  [id number]                             Onset Date:  11/29/23   Start of Care Date:  11/29/23   Type:     _X_PT   ___OT   ___SLP Medical Diagnosis:                 PT Diagnosis:  impaired functional mobility Visits from SOC:  1     See note for plan of treatment, functional goals and certification details    I CERTIFY THE NEED FOR THESE SERVICES FURNISHED UNDER        THIS PLAN OF TREATMENT AND WHILE UNDER MY  CARE     (Physician co-signature of this document indicates review and certification of the therapy plan).

## 2023-11-29 NOTE — PHARMACY-ANTICOAGULATION SERVICE
Clinical Pharmacy - Warfarin Dosing Consult     Pharmacy has been consulted to manage this patient s warfarin therapy.  Indication: Atrial Fibrillation  Therapy Goal: INR 2-2.5  Provider/Team: Ortho  Warfarin Prior to Admission: Yes  Warfarin PTA Regimen: 2.5 mg on Mondays, 5 mg all other days  Recent documented change in oral intake/nutrition: No  Dose Comments: Higher dose POD 0    INR   Date Value Ref Range Status   11/29/2023 1.14 0.85 - 1.15 Final   03/02/2020 2.15 (H) 0.90 - 1.10 Final       Recommend warfarin 7 mg today.  Pharmacy will monitor Betty Mitchell daily and order warfarin doses to achieve specified goal.      Please contact pharmacy as soon as possible if the warfarin needs to be held for a procedure or if the warfarin goals change.

## 2023-11-29 NOTE — ANESTHESIA PREPROCEDURE EVALUATION
Anesthesia Pre-Procedure Evaluation    Patient: Betty Mitchell   MRN: [id number] : 1950        Procedure : Procedure(s):  RIGHT TOTAL HIP ARTHROPLASTY          Past Medical History:   Diagnosis Date    Antiplatelet or antithrombotic long-term use     Arthritis     Atrial fibrillation (H)     Cerebral aneurysm, nonruptured     Coronary artery disease     Heart murmur 2023    Hyperlipidemia     Hypertension     Obese     Osteopenia     PONV (postoperative nausea and vomiting)     Sleep apnea     CPAP    Slow to wake up after anesthesia       Past Surgical History:   Procedure Laterality Date    CARDIAC ELECTROPHYSIOLOGY MAPPING AND ABLATION  2017    PVI    CATARACT EXTRACTION Left     EP ABLATION AFLUTTER  2017    Procedure: EP Ablation Atrial Flutter;  Surgeon: Trevin Silveira MD;  Location: [facility] Cath Lab;  Service:     EP ABLATION PVI Left 2017    PVI 2017---cryo to 6 PVs and RCTI with SWA    EYE SURGERY Left     Vitrectomy    FOOT SURGERY Right     x2    LUMPECTOMY BREAST Right 2012    TUBAL LIGATION  1984      No Known Allergies   Social History     Tobacco Use    Smoking status: Never    Smokeless tobacco: Never   Substance Use Topics    Alcohol use: Yes     Comment: occasional      Wt Readings from Last 1 Encounters:   23 91.6 kg (202 lb)        Anesthesia Evaluation            ROS/MED HX  ENT/Pulmonary:  - neg pulmonary ROS   (+) sleep apnea,                                      Neurologic:  - neg neurologic ROS     Cardiovascular:  - neg cardiovascular ROS   (+)  hypertension- -  CAD -  - -   Taking blood thinners                        valvular problems/murmurs           METS/Exercise Tolerance: >4 METS    Hematologic:  - neg hematologic  ROS     Musculoskeletal:  - neg musculoskeletal ROS     GI/Hepatic:  - neg GI/hepatic ROS     Renal/Genitourinary:  - neg Renal ROS     Endo:  - neg endo ROS   (+)               Obesity,       Psychiatric/Substance  "Use:  - neg psychiatric ROS     Infectious Disease:  - neg infectious disease ROS     Malignancy:  - neg malignancy ROS     Other:  - neg other ROS          Physical Exam    Airway        Mallampati: II    Neck ROM: full     Respiratory Devices and Support         Dental           Cardiovascular   cardiovascular exam normal          Pulmonary   pulmonary exam normal                OUTSIDE LABS:  CBC:   Lab Results   Component Value Date    WBC 9.7 03/02/2020    WBC 11.4 (H) 03/01/2020    HGB 13.1 03/02/2020    HGB 14.5 03/01/2020    HCT 39.7 03/02/2020    HCT 43.8 03/01/2020     03/02/2020     03/01/2020     BMP:   Lab Results   Component Value Date     11/08/2023     07/12/2023    POTASSIUM 4.0 11/08/2023    POTASSIUM 4.1 07/12/2023    CHLORIDE 106 11/08/2023    CHLORIDE 107 07/12/2023    CO2 23 11/08/2023    CO2 25 07/12/2023    BUN 12.6 11/08/2023    BUN 12.4 07/12/2023    CR 0.84 11/08/2023    CR 0.79 07/12/2023     (H) 11/08/2023     (H) 07/12/2023     COAGS:   Lab Results   Component Value Date    INR 1.14 11/29/2023     POC: No results found for: \"BGM\", \"HCG\", \"HCGS\"  HEPATIC:   Lab Results   Component Value Date    ALBUMIN 4.0 11/08/2023    PROTTOTAL 7.5 11/08/2023    ALT 39 11/08/2023    AST 40 11/08/2023    ALKPHOS 65 11/08/2023    BILITOTAL 0.4 11/08/2023     OTHER:   Lab Results   Component Value Date    LISA 9.4 11/08/2023    MAG 2.1 07/12/2023    LIPASE 43 03/01/2020    TSH 2.27 09/16/2020       Anesthesia Plan    ASA Status:  3       Anesthesia Type: Spinal.              Consents    Anesthesia Plan(s) and associated risks, benefits, and realistic alternatives discussed. Questions answered and patient/representative(s) expressed understanding.     - Discussed:     - Discussed with:  Patient            Postoperative Care            Comments:               Jesús Ugalde MD    I have reviewed the pertinent notes and labs in the chart from the past 30 days and " "(re)examined the patient.  Any updates or changes from those notes are reflected in this note.            # Drug Induced Coagulation Defect: home medication list includes an anticoagulant medication   # Obesity: Estimated body mass index is 38.17 kg/m  as calculated from the following:    Height as of this encounter: 1.549 m (5' 1\").    Weight as of this encounter: 91.6 kg (202 lb).      "

## 2023-11-29 NOTE — ANESTHESIA CARE TRANSFER NOTE
Patient: Betty Mitchell    Procedure: Procedure(s):  RIGHT TOTAL HIP ARTHROPLASTY       Diagnosis: Osteoarthritis of right hip [M16.11]  Diagnosis Additional Information: No value filed.    Anesthesia Type:   Spinal     Note:    Oropharynx: oropharynx clear of all foreign objects  Level of Consciousness: drowsy  Oxygen Supplementation: face mask  Level of Supplemental Oxygen (L/min / FiO2): 8  Independent Airway: airway patency satisfactory and stable  Dentition: dentition unchanged  Vital Signs Stable: post-procedure vital signs reviewed and stable  Report to RN Given: handoff report given  Patient transferred to: PACU    Handoff Report: Identifed the Patient, Identified the Reponsible Provider, Reviewed the pertinent medical history, Discussed the surgical course, Reviewed Intra-OP anesthesia mangement and issues during anesthesia, Set expectations for post-procedure period and Allowed opportunity for questions and acknowledgement of understanding      Vitals:  Vitals Value Taken Time   /61 11/29/23 1047   Temp 97.9f 11/29/23   1047   Pulse 90 11/29/23 1047   Resp 22 11/29/23 1047   SpO2 99 % 11/29/23 1047       Electronically Signed By: CARLOS A RUSSO CRNA  November 29, 2023  10:48 AM

## 2023-11-29 NOTE — ANESTHESIA POSTPROCEDURE EVALUATION
Patient: Betty Mitchell    Procedure: Procedure(s):  RIGHT TOTAL HIP ARTHROPLASTY       Anesthesia Type:  Spinal    Note:  Disposition: Inpatient   Postop Pain Control: Uneventful            Sign Out: Well controlled pain   PONV: No   Neuro/Psych: Uneventful            Sign Out: Acceptable/Baseline neuro status   Airway/Respiratory: Uneventful            Sign Out: Acceptable/Baseline resp. status   CV/Hemodynamics: Uneventful            Sign Out: Acceptable CV status; No obvious hypovolemia; No obvious fluid overload   Other NRE:    DID A NON-ROUTINE EVENT OCCUR?            Last vitals:  Vitals Value Taken Time   /62 11/29/23 1150   Temp     Pulse 72 11/29/23 1151   Resp 15 11/29/23 1151   SpO2 98 % 11/29/23 1151   Vitals shown include unfiled device data.    Electronically Signed By: Jesús Ugalde MD  November 29, 2023  1:55 PM

## 2023-11-29 NOTE — PROGRESS NOTES
"[facility]    Medicine Progress Note - Hospitalist Service    Date of Admission:  11/29/2023    Assessment & Plan   73-year-old female with a history of hypertension, atrial fibrillation, KATHYA underwent a right AKIKO.    Status post right AKIKO  Assessment: Surgery under spinal anesthesia 250  cc  Plan  Pain management, PT and OT orders per orthopedics    Paroxysmal atrial fibrillation  Assessment: EKG 11/8/2023 showed NSR, on Cardizem  mg daily, warfarin with a goal INR 2-3  Plan  Continue Cardizem CD with hold parameters  Warfarin per pharmacy    Essential hypertension  Assessment: BP well-controlled, home medications: Lisinopril-HCTZ 20/12.5 mg daily, Cardizem  mg daily  Plan  Above medication with hold parameters    KATHYA  Assessment: CPAP nightly  Plan  Continue home CPAP          Diet: Advance Diet as Tolerated: Regular Diet Adult    DVT Prophylaxis: Warfarin  Loco Catheter: Not present  Lines: None     Cardiac Monitoring: None  Code Status: Full Code      Clinically Significant Risk Factors Present on Admission               # Drug Induced Coagulation Defect: home medication list includes an anticoagulant medication    # Hypertension: Noted on problem list      # Obesity: Estimated body mass index is 38.17 kg/m  as calculated from the following:    Height as of this encounter: 1.549 m (5' 1\").    Weight as of this encounter: 91.6 kg (202 lb).              Disposition Plan      Expected Discharge Date: 11/30/2023                    Rigo Davalos MD  Hospitalist Service  [facility]  Securely message with [employer] (more info)  Text page via [employer] Paging/Directory   ______________________________________________________________________    Interval History   Patient reports feeling okay, denies any chest pain or shortness of breath or abdominal pain or nausea or vomiting    Physical Exam   Vital Signs: Temp: 97.1  F (36.2  C) Temp src: Oral BP: 134/65 " Pulse: 70   Resp: 16 SpO2: 96 % O2 Device: None (Room air) Oxygen Delivery: 6 LPM  Weight: 202 lbs 0 oz      General Awake, alert and comfortable   HEENT Pupils equal, round and reactive, no pallor, no icterus.    Neck Supple, no LAD, no JVD   Lungs Clear to auscultation b/l, resonant   Heart S1,S2, RRR, no murmur    Abdomen  Soft, NT, BS+, no palpable organomegaly   CNS Awake, alert, oriented x 3,  Moves All extr Equally    Extremeties No Edema   Skin Visible skin free of rashes, breakdown. Normal Skin Turgor   Muskuloskeletal Status post right AKIKO   Psychiatry Normal mood and affect.              Medical Decision Making       I reviewed the outpatient labs, EKG and      Data     I have personally reviewed the following data over the past 24 hrs:    INR:  1.14 PTT:  N/A   D-dimer:  N/A Fibrinogen:  N/A       Imaging results reviewed over the past 24 hrs:   Recent Results (from the past 24 hour(s))   XR Pelvis Port 1/2 Views    Narrative    EXAM: XR PELVIS PORT 1/2 VIEWS  LOCATION: [facility]  DATE: 11/29/2023    INDICATION: Status post Hip surgery  COMPARISON: None.      Impression    IMPRESSION: Status post recent right hip arthroplasty. No immediate hardware complication. Expected postsurgical soft tissue edema, subcutaneous emphysema, and gas containing joint effusion. No acute fracture or malalignment.

## 2023-11-29 NOTE — PLAN OF CARE
Problem: Pain Acute  Goal: Optimal Pain Control and Function  Outcome: Progressing  Intervention: Develop Pain Management Plan  Recent Flowsheet Documentation  Taken 11/29/2023 1334 by Lara Sevilla RN  Pain Management Interventions:   medication (see MAR)   cold applied   repositioned  Taken 11/29/2023 1249 by Lara Sevilla RN  Pain Management Interventions:   medication (see MAR)   emotional support     Problem: Hip Arthroplasty  Goal: Acceptable Pain Control  Outcome: Progressing  Intervention: Prevent or Manage Pain  Recent Flowsheet Documentation  Taken 11/29/2023 1334 by Lara Sevilla RN  Pain Management Interventions:   medication (see MAR)   cold applied   repositioned  Taken 11/29/2023 1249 by Lara Sevilla RN  Pain Management Interventions:   medication (see MAR)   emotional support   Goal Outcome Evaluation:    Patient is alert and oriented. Pt endorses right hip pain, controlled with pain medications and ice. Pt has full sensations and mobility back in her lower extremities. Pt tolerating ice chips and fluids. Does not yet have appetite. Pt is on L.R. at 100ml/hr. Pt has wedge in place. Per surgery, Pt is 50% WB on right leg, with use of walker when ambulating.     Lara Sevilla, ARTI

## 2023-11-29 NOTE — PHARMACY-ADMISSION MEDICATION HISTORY
Pharmacist Admission Medication History    Admission medication history is complete. The information provided in this note is only as accurate as the sources available at the time of the update.    Information Source(s): Patient via in-person    Pertinent Information: utilized pt provided home med list.       Allergies reviewed with patient and updates made in EHR: yes    Medication History Completed By: Cherie Kirk PharmD 11/29/2023 6:51 AM    Prior to Admission medications    Medication Sig Last Dose Taking? Auth Provider Long Term End Date   albuterol (PROAIR HFA/PROVENTIL HFA/VENTOLIN HFA) 108 (90 Base) MCG/ACT inhaler Inhale 2 puffs into the lungs every 6 hours as needed for shortness of breath, wheezing or cough Unknown at has never needed Yes Unknown, Entered By History Yes    alendronate (FOSAMAX) 70 MG tablet Take 70 mg by mouth once a week Takes on Sundays 11/26/2023 at 0700 Yes Reported, Patient Yes    calcium carbonate (SUPER CALCIUM) 1500 (600 Ca) MG tablet Take 600 mg by mouth daily Past Week Yes Reported, Patient     cyclobenzaprine (FLEXERIL) 5 MG tablet Take 5 mg by mouth 3 times daily as needed for muscle spasms Unknown at prn Yes Reported, Patient     diltiazem ER COATED BEADS (CARDIZEM CD/CARTIA XT) 240 MG 24 hr capsule Take 1 capsule by mouth daily. 11/28/2023 at takes in pm Yes Amanda Martin, APRN CNP Yes    lisinopril-hydrochlorothiazide (ZESTORETIC) 20-12.5 MG tablet Take 1 tablet by mouth daily 11/28/2023 Yes Reported, Patient Yes    multivitamin, therapeutic (THERA-VIT) TABS tablet Take 1 tablet by mouth daily Stopping 11/22/23 before surgery Past Week Yes Reported, Patient     Omega-3 1000 MG capsule Take 1,200 mg by mouth daily Stopping 11/22/23 before surgery Past Week Yes Reported, Patient     warfarin ANTICOAGULANT (COUMADIN) 5 MG tablet Take 2.5-5 mg by mouth daily 2.5 mg on Mondays, 5mg ROW 11/23/2023 Yes Reported, Patient

## 2023-11-29 NOTE — OP NOTE
"Operative Note    PROCEDURE:  RIGHT MINIMALLY INVASIVE TOTAL HIP ARTHROPLASTY    Pre-Procedure Diagnosis:  RIGHT Hip Primary OA (osteoarthritis) of hip     Post-Procedure Diagnosis:    RIGHT Hip Primary OA(osteoarthritis) of hip    Surgeon(s):  Indra Hooper MD    Assist:  Raudel Garcia PA-C  A PAC was necessary to ensure safety of this patientand adequate progression of the procedure.    Anesthesia Type:  Spinal    Complications:  Small posterior wall fracture    Condition on discharge from OR:  Stable    Estimated Blood Loss:   250 cc    Specimens:    None    Implants Used:  Smith and Nephew R3 cup (size 48 mm), 32 mm ID/ 48 OD +4mm, 20 degree XLPE liner, Polar stem (size 2 STANDARD), 32 mm femoral head (+0 mm length)       Drains:   None    Description:  Patient brought to the operating room and after adequate anesthesia was induced, the left lower extremity wasprepped and draped in the usual sterile fashion while the patient was positioned in the lateral decubitus position.  I went ahead with an MIS incision and proceeded through the skin and subQ and identified the gluteusmaximus fascia.  I opened the fascia in line with the fibers and placed an east, west retractor.  We internally rotated the hip and took down the external rotators.  At this point I performed a \"T\" capsulotomy and saved that capsule for later repair.  I used the Smith and Nephew length, offset device to establish a baseline leg length and then dislocated the hip. Per pre-operative templating, I made a neck cut 10 mm above the lesser trochanter and removed the femoral head which had grade 4 chondromalacia.  The proximal femur was carefully retracted and the acetabular labrum and fovea were excised.  I medialized with a 43 mm reamer to the medial wall and circumferentially reamed up to a 47 mm reamer and then impacted a 48 mm acetabular component.  While inspecting the fixation, which felt very tight, a small posterior wall fracture was " visualized.  3 screws were utilized, a 25 mm and a 20 mm up the ileum, each of which had an excellent purchase.  Then a 15 mm was used to capture the small posterior wall fragment.  Then, I locked the polyethylene liner.  Next, we turned attention to the femur.  A cookie cutterand canal finder were used to open the proximal femur.  I lateralized with a lateralizing reamer and then we used sequential broaches up to a size 2 broach and it had good fit,fill, and stability.  We trialed both high and standard offset necks, and settled on the standard offset neck due to best soft tissue tensioning.  With the +0 mm X 32 mm trial ball in place, we ran the hip through a range of motion.  My assist flexed the hip to 90 degrees and internally rotated to 90 degrees before it was almost dislocated.  It was stable in full extension and maximal external rotation as well- with no impingement.  The offset was slightly changed by about 2-3 mm, and the length matched our goal length (+3mm).  At this point, we removed the broach and placed the final implant. (2 Standard Polar pressfit stem).  Once ready, we did a final trial and our stability, offset, and length matched our trial above.  We went ahead and impacted the +0 mm X 32 mm ball on the trunion and reduced the hip.  At this point, I repaired the posterior capsule with 1-0 Vicryl.  I irrigated copiously with pulse lavage.  I then closed the gluteus fascia with #1 Vicryl in an interrupted figure of 8 fashion.  I closed the small opening in the IT band with #1 PDS in an interrupted horizontal mattress fashion.  The subQ was closed with 2.0 vicryl and the skin was closed with 3-0 monocryl in a subcuticular fashion.  Steri-strips and Sterile bandages were applied and the patient was returned to the PACU in excellent condition.    Plan:  Foot Flat, 50% weight bearing for 6 weeks  F/U in 2 weeks  Leave dressing for 10 days  DVT Prophylaxis:  Resume normal dosing of warfarin.        Indra Hooper

## 2023-11-30 ENCOUNTER — APPOINTMENT (OUTPATIENT)
Dept: PHYSICAL THERAPY | Facility: CLINIC | Age: 73
End: 2023-11-30
Attending: ORTHOPAEDIC SURGERY
Payer: COMMERCIAL

## 2023-11-30 ENCOUNTER — APPOINTMENT (OUTPATIENT)
Dept: OCCUPATIONAL THERAPY | Facility: CLINIC | Age: 73
End: 2023-11-30
Attending: ORTHOPAEDIC SURGERY
Payer: COMMERCIAL

## 2023-11-30 LAB
FASTING STATUS PATIENT QL REPORTED: YES
GLUCOSE SERPL-MCNC: 129 MG/DL (ref 70–99)
HGB BLD-MCNC: 12.6 G/DL (ref 11.7–15.7)
INR PPP: 1.09 (ref 0.85–1.15)

## 2023-11-30 PROCEDURE — 97535 SELF CARE MNGMENT TRAINING: CPT | Mod: GO

## 2023-11-30 PROCEDURE — 85018 HEMOGLOBIN: CPT | Performed by: ORTHOPAEDIC SURGERY

## 2023-11-30 PROCEDURE — 85610 PROTHROMBIN TIME: CPT | Performed by: ORTHOPAEDIC SURGERY

## 2023-11-30 PROCEDURE — 97110 THERAPEUTIC EXERCISES: CPT | Mod: GP

## 2023-11-30 PROCEDURE — 250N000011 HC RX IP 250 OP 636: Performed by: ORTHOPAEDIC SURGERY

## 2023-11-30 PROCEDURE — 250N000013 HC RX MED GY IP 250 OP 250 PS 637: Performed by: ORTHOPAEDIC SURGERY

## 2023-11-30 PROCEDURE — 97116 GAIT TRAINING THERAPY: CPT | Mod: GP

## 2023-11-30 PROCEDURE — 250N000013 HC RX MED GY IP 250 OP 250 PS 637: Performed by: INTERNAL MEDICINE

## 2023-11-30 PROCEDURE — 99232 SBSQ HOSP IP/OBS MODERATE 35: CPT | Performed by: INTERNAL MEDICINE

## 2023-11-30 PROCEDURE — 82947 ASSAY GLUCOSE BLOOD QUANT: CPT | Performed by: ORTHOPAEDIC SURGERY

## 2023-11-30 PROCEDURE — 97165 OT EVAL LOW COMPLEX 30 MIN: CPT | Mod: GO

## 2023-11-30 PROCEDURE — 36415 COLL VENOUS BLD VENIPUNCTURE: CPT | Performed by: ORTHOPAEDIC SURGERY

## 2023-11-30 RX ADMIN — ACETAMINOPHEN 975 MG: 325 TABLET ORAL at 18:41

## 2023-11-30 RX ADMIN — ACETAMINOPHEN 975 MG: 325 TABLET ORAL at 08:15

## 2023-11-30 RX ADMIN — WARFARIN SODIUM 7 MG: 5 TABLET ORAL at 18:38

## 2023-11-30 RX ADMIN — ONDANSETRON 4 MG: 4 TABLET, ORALLY DISINTEGRATING ORAL at 07:13

## 2023-11-30 RX ADMIN — CEFAZOLIN SODIUM 2 G: 2 INJECTION, SOLUTION INTRAVENOUS at 01:35

## 2023-11-30 RX ADMIN — PROCHLORPERAZINE EDISYLATE 5 MG: 5 INJECTION INTRAMUSCULAR; INTRAVENOUS at 09:42

## 2023-11-30 RX ADMIN — DILTIAZEM HYDROCHLORIDE 240 MG: 120 CAPSULE, EXTENDED RELEASE ORAL at 21:03

## 2023-11-30 RX ADMIN — LISINOPRIL AND HYDROCHLOROTHIAZIDE 1 TABLET: 12.5; 2 TABLET ORAL at 08:16

## 2023-11-30 ASSESSMENT — ACTIVITIES OF DAILY LIVING (ADL)
IADL_COMMENTS: IND FOR IADLS
ADLS_ACUITY_SCORE: 22

## 2023-11-30 NOTE — PLAN OF CARE
Goal Outcome Evaluation:       Patient vital signs are at baseline: Yes  Patient able to ambulate as they were prior to admission or with assist devices provided by therapies during their stay:  No,  Reason:  Patient is currently flat foot, 50% Weight bearing. Assist of 1 with walker.  Patient MUST void prior to discharge:  Yes  Patient able to tolerate oral intake:  No,  Reason:  Nausea and vomiting. Antiemetics given.  Pain has adequate pain control using Oral analgesics:  Yes  Does patient have an identified :  Yes  Has goal D/C date and time been discussed with patient:  Yes       Nausea improved after compazine IV. Still has no appetite. Drinking fluids without difficulty.

## 2023-11-30 NOTE — PROGRESS NOTES
Physical Therapy Discharge Summary    Reason for therapy discharge:    All goals and outcomes met, no further needs identified.  No further expectations of functional progress.    Progress towards therapy goal(s). See goals on Care Plan in [facility] electronic health record for goal details.  Goals met    Therapy recommendation(s):    Continue home exercise program.

## 2023-11-30 NOTE — PROGRESS NOTES
"[facility] Progress Note      Post-operative Day: 1 Day Post-Op    Procedure(s):  RIGHT TOTAL HIP ARTHROPLASTY      Subjective:  Betty reports nausea and once incident of vomiting this morning. She tried zofran earlier today, but that did not help. Nurse was administering compazine to help with nausea. She does note she is passing gas and denies abdominal pain. She has not eaten much due to nausea. Has not had oxycodone for pain management since last night. She rates her pain as a 2/10 at rest, and a 4-6/10 with transfers and PT. She does not some pain in her calves bilaterally, but notes this is normal for her. She has this pain when getting a normal massage.     Pain: moderate  Chest pain, SOB:  No      Objective:  Blood pressure (!) 149/72, pulse 65, temperature 97.4  F (36.3  C), temperature source Oral, resp. rate 16, height 1.549 m (5' 1\"), weight 91.6 kg (202 lb), SpO2 94%.    Patient Vitals for the past 24 hrs:   BP Temp Temp src Pulse Resp SpO2   11/30/23 0748 (!) 149/72 97.4  F (36.3  C) Oral 65 16 94 %   11/30/23 0402 117/59 97.3  F (36.3  C) Oral 66 18 98 %   11/29/23 2310 (!) 145/71 97.3  F (36.3  C) Oral 69 18 99 %   11/29/23 2230 -- -- -- -- -- 98 %   11/29/23 2102 139/67 -- -- 66 -- --   11/29/23 1910 (!) 170/95 97.3  F (36.3  C) Oral 68 18 97 %   11/29/23 1510 (!) 170/78 97.6  F (36.4  C) Oral 79 17 96 %   11/29/23 1410 (!) 144/75 97.4  F (36.3  C) Oral 68 16 96 %   11/29/23 1310 134/65 97.1  F (36.2  C) Oral 70 16 96 %   11/29/23 1240 138/64 97.2  F (36.2  C) Oral 68 17 96 %   11/29/23 1210 132/65 97.6  F (36.4  C) Oral 72 16 96 %   11/29/23 1150 -- -- -- -- -- 94 %   11/29/23 1120 122/60 -- -- 73 18 97 %   11/29/23 1110 120/59 -- -- 75 18 95 %   11/29/23 1100 121/55 -- -- 80 15 95 %   11/29/23 1050 138/65 -- -- 84 23 98 %   11/29/23 1047 131/61 -- -- 90 22 99 %       Wt Readings from Last 4 Encounters:   11/29/23 91.6 kg (202 lb)   02/15/22 94.3 kg (208 lb)   08/05/20 97.5 kg (215 " lb)   09/26/19 93.4 kg (206 lb)         Motor function, sensation, and circulation intact   Yes  Wound status: incisions are clean dry and intact. Yes, minimal shadowing noted on dressing  Calf tenderness: Bilateral  No    Pertinent Labs   Lab Results: personally reviewed.     Recent Labs   Lab Test 11/30/23  0743 11/29/23  0625 11/08/23  0917 07/12/23  0845 09/13/22  0843 09/16/20  0817 03/02/20  0508 03/02/20  0110 03/01/20 2050 09/04/19  0911 05/08/19  1153   INR 1.09 1.14  --   --   --   --  2.15*   < >  --   --   --    WBC  --   --   --   --   --   --  9.7  --  11.4*  --  10.2   HGB 12.6  --   --   --   --   --  13.1  --  14.5  --  14.3   HCT  --   --   --   --   --   --  39.7  --  43.8  --  44.2   MCV  --   --   --   --   --   --  89  --  88  --  87   PLT  --   --   --   --   --   --  305  --  346  --  337   NA  --   --  142 143 141   < > 141  --   --    < >  --     < > = values in this interval not displayed.       Plan: Anticoagulation protocol: Resume pre-op coumadin management per primary care provider/hospitalist              Pain medications:  scopainmedication: oxycodone and tylenol            Weight bearing status:  50% WBAT x 6 weeks secondary to acetabular fracture during surgery, foot flat when ambulating            Nausea/vomiting: One episode of emesis this morning, passing gas though nurse notes faint bowel sounds, compazine helping with the nausea though still nauseous. Will continue to monitor throughout the day. If no more episodes of vomiting and able to tolerate oral intake, will discharge tomorrow, will prescribe compazine at discharge if helpful            Disposition: Home per PT once nausea/vomiting is better managed, likely tomorrow morning as long as no more episodes of vomiting later today and compazine manages her nausea            Continue cares and rehabilitation     Report completed by:  Melanie Reynolds PA-C  Date: 11/30/2023  Time: 10:15 AM

## 2023-11-30 NOTE — PROGRESS NOTES
Patient vital signs are at baseline: Yes  Patient able to ambulate as they were prior to admission or with assist devices provided by therapies during their stay:  Yes  Patient MUST void prior to discharge:  Yes  Patient able to tolerate oral intake:  No,  Reason:  decrease  appetite  Pain has adequate pain control using Oral analgesics:  Yes  Does patient have an identified :  Yes  Has goal D/C date and time been discussed with patient:  Yes

## 2023-11-30 NOTE — PROGRESS NOTES
Occupational Therapy Discharge Summary    Reason for therapy discharge:    All goals and outcomes met, no further needs identified.    Progress towards therapy goal(s). See goals on Care Plan in [facility] electronic health record for goal details.  Goals met    Therapy recommendation(s):    No further therapy is recommended.       11/30/23 1000   Appointment Info   Signing Clinician's Name / Credentials (OT) Nydia Albert OTR/L   Quick Adds   Quick Adds Certification   Living Environment   People in Home alone   Current Living Arrangements house   Living Environment Comments tub shower   Self-Care   Equipment Currently Used at Home dressing device  (has raised toilet seat, tub bench, reacher, and sock aid)   Activity/Exercise/Self-Care Comment ind for BADLs   Instrumental Activities of Daily Living (IADL)   IADL Comments ind for IADLs   General Information   Onset of Illness/Injury or Date of Surgery 11/29/23   Referring Physician Indra Hooper MD   Patient/Family Therapy Goal Statement (OT) less nausea   Additional Occupational Profile Info/Pertinent History of Current Problem s/p R AKIKO   Existing Precautions/Restrictions fall;no hip ADD past midline;no hip IR   Right Lower Extremity (Weight-bearing Status) partial weight-bearing (PWB)  (50% foot flat)   Cognitive Status Examination   Affect/Mental Status (Cognitive) WFL   Follows Commands WFL   Pain Assessment   Patient Currently in Pain Yes, see Vital Sign flowsheet   Range of Motion Comprehensive   General Range of Motion no range of motion deficits identified   Strength Comprehensive (MMT)   Comment, General Manual Muscle Testing (MMT) Assessment BUE WFL   Bed Mobility   Bed Mobility sit-supine   Sit-Supine Williams (Bed Mobility) verbal cues;supervision   Transfers   Transfers sit-stand transfer;toilet transfer   Sit-Stand Transfer   Sit-Stand Williams (Transfers) contact guard;verbal cues   Toilet Transfer   Type (Toilet Transfer) sit-stand;stand-sit    Council Level (Toilet Transfer) contact guard;verbal cues   Activities of Daily Living   BADL Assessment/Intervention lower body dressing;toileting   Lower Body Dressing Assessment/Training   Council Level (Lower Body Dressing) minimum assist (75% patient effort)   Toileting   Council Level (Toileting) supervision   Clinical Impression   Criteria for Skilled Therapeutic Interventions Met (OT) Yes, treatment indicated   OT Diagnosis dec BADL indep   OT Problem List-Impairments impacting ADL problems related to;activity tolerance impaired;mobility;pain;post-surgical precautions   Assessment of Occupational Performance 5 or more Performance Deficits   Identified Performance Deficits dressing, toileting, bathing, household mobility, functional transfers   Planned Therapy Interventions (OT) ADL retraining;bed mobility training;transfer training;progressive activity/exercise   Clinical Decision Making Complexity (OT) problem focused assessment/low complexity   Risk & Benefits of therapy have been explained evaluation/treatment results reviewed;participants included;patient   OT Total Evaluation Time   OT Eval, Low Complexity Minutes (98626) 10   Therapy Certification   Medical Diagnosis AKIKO   Start of Care Date 11/30/23   Certification date from 11/30/23   Certification date to 12/07/23   OT Goals   Therapy Frequency (OT) One time eval and treatment   OT Predicted Duration/Target Date for Goal Attainment 11/30/23   OT Goals Lower Body Dressing;Bed Mobility;Toilet Transfer/Toileting   OT: Lower Body Dressing Supervision/stand-by assist;within precautions;using adaptive equipment   OT: Bed Mobility Independent;supine to/from sitting;within precautions   OT: Toilet Transfer/Toileting Supervision/stand-by assist;toilet transfer;using adaptive equipment;cleaning and garment management;within precautions   Self-Care/Home Management   Self-Care/Home Mgmt/ADL, Compensatory, Meal Prep Minutes (61748) 24   Symptoms  Noted During/After Treatment (Meal Preparation/Planning Training) increased pain  (nausea)   Treatment Detail/Skilled Intervention Pt finishing in bathroom with NA upon arrival - cues for hand placement to increase ease with toilet transfer and simulate home, improves to SBA with cues. SBA with FWW for mobility back to bed. Educ on techniques to maintain precautions during LB dressing w AE and use of FWW for balance/WB, tub transfer bench technique, car transfer technique, and bed mobility AE vs use of pants. Following educ and with initial cues, setup and SBA to don underwear and pants with reacher, ind for sit > supine and adjusting in bed, and pt able to verbalize technique for tub transfer bench and car transfer techniques. Provided AKIKO precaution handout.   OT Discharge Planning   OT Plan d/c   OT Discharge Recommendation (DC Rec)   (defer to ortho)   OT Rationale for DC Rec sister staying with pt and able to assist with IADLs/provide SBA for bathing   OT Brief overview of current status mod IND/SBA for BADLs   Total Session Time   Timed Code Treatment Minutes 24   Total Session Time (sum of timed and untimed services) 34      [facility]  OUTPATIENT OCCUPATIONAL THERAPY  EVALUATION  PLAN OF TREATMENT FOR OUTPATIENT REHABILITATION  (COMPLETE FOR INITIAL CLAIMS ONLY)  Patient's Last Name, First Name, M.I.  YOB: 1950  PaulaBetty  L                          Provider's Name  [facility] Medical Record No.  [id number]                             Onset Date:  11/29/23   Start of Care Date:  11/30/23   Type:     ___PT   _X_OT   ___SLP Medical Diagnosis:  AKIKO                    OT Diagnosis:  dec BADL indep Visits from SOC:  1     See note for plan of treatment, functional goals and certification details    I CERTIFY THE NEED FOR THESE SERVICES FURNISHED UNDER        THIS PLAN OF TREATMENT AND WHILE UNDER MY CARE     (Physician  co-signature of this document indicates review and certification of the therapy plan).

## 2023-11-30 NOTE — PROGRESS NOTES
"[facility]    Medicine Progress Note - Hospitalist Service    Date of Admission:  11/29/2023    Assessment & Plan   73-year-old female with a history of hypertension, atrial fibrillation, KATHYA underwent a right AKIKO.    Status post right AKIKO  Assessment: Surgery under spinal anesthesia 250  cc  Plan  Pain management, PT and OT orders per orthopedics    Paroxysmal atrial fibrillation  Assessment: EKG 11/8/2023 showed NSR, on Cardizem  mg daily, warfarin with a goal INR 2-3  Plan  Continue Cardizem CD with hold parameters  Warfarin per pharmacy  Will not need bridging for discharge,    Essential hypertension  Assessment: BP well-controlled, home medications: Lisinopril-HCTZ 20/12.5 mg daily, Cardizem  mg daily  Plan  Above medication with hold parameters    KATHYA  Assessment: CPAP nightly  Plan  Continue home CPAP      Nausea and vomiting  assessment: Persistent nausea and vomiting this morning, improved with Zofran and then Compazine  Plan  Continue antiemetics  Continue to monitor          Diet: Advance Diet as Tolerated: Regular Diet Adult  Discharge Instruction - Regular Diet Adult    DVT Prophylaxis: Warfarin  Loco Catheter: Not present  Lines: None     Cardiac Monitoring: None  Code Status: Full Code      Clinically Significant Risk Factors Present on Admission               # Drug Induced Coagulation Defect: home medication list includes an anticoagulant medication    # Hypertension: Noted on problem list      # Obesity: Estimated body mass index is 38.17 kg/m  as calculated from the following:    Height as of this encounter: 1.549 m (5' 1\").    Weight as of this encounter: 91.6 kg (202 lb).              Disposition Plan      Expected Discharge Date: 11/30/2023                    Rigo Davalos MD  Hospitalist Service  [facility]  Securely message with [employer] (more info)  Text page via [facility] Paging/Directory "   ______________________________________________________________________    Interval History   Reports nausea that has improved after the medications, no abdominal pain, no chest pain or dyspnea    Physical Exam   Vital Signs: Temp: 97.4  F (36.3  C) Temp src: Oral BP: (!) 149/72 Pulse: 65   Resp: 16 SpO2: 94 % O2 Device: None (Room air) Oxygen Delivery: 6 LPM  Weight: 202 lbs 0 oz      General Awake, alert and comfortable, in bed with nasal CPAP   HEENT Pupils equal, round and reactive, no pallor, no icterus.    Neck Supple, no LAD, no JVD   Lungs Clear to auscultation b/l, resonant   Heart S1,S2, RRR, no murmur    Abdomen  Soft, NT, BS+, no palpable organomegaly   CNS Awake, alert, oriented x 3,  Moves All extr Equally    Extremeties No Edema   Skin Visible skin free of rashes, breakdown. Normal Skin Turgor   Muskuloskeletal Status post right AKIKO   Psychiatry Normal mood and affect.              Medical Decision Making       I reviewed the outpatient labs, EKG and      Data     I have personally reviewed the following data over the past 24 hrs:    N/A  \   12.6   / N/A     N/A N/A N/A /  129 (H)   N/A N/A N/A \     INR:  1.09 PTT:  N/A   D-dimer:  N/A Fibrinogen:  N/A       Imaging results reviewed over the past 24 hrs:   No results found for this or any previous visit (from the past 24 hour(s)).

## 2023-11-30 NOTE — PROGRESS NOTES
Patient vital signs are at baseline: Yes  Patient able to ambulate as they were prior to admission or with assist devices provided by therapies during their stay: No. Ambulating with walker and gb. Partial weight bearing to RLE.  Patient MUST void prior to discharge:  Yes  Patient able to tolerate oral intake:  Yes  Pain has adequate pain control using Oral analgesics:  Yes  Does patient have an identified :  Yes  Has goal D/C date and time been discussed with patient:  Yes

## 2023-12-01 VITALS
BODY MASS INDEX: 38.14 KG/M2 | RESPIRATION RATE: 16 BRPM | SYSTOLIC BLOOD PRESSURE: 119 MMHG | DIASTOLIC BLOOD PRESSURE: 65 MMHG | HEART RATE: 56 BPM | WEIGHT: 202 LBS | HEIGHT: 61 IN | TEMPERATURE: 97.7 F | OXYGEN SATURATION: 97 %

## 2023-12-01 LAB
FASTING STATUS PATIENT QL REPORTED: NO
GLUCOSE SERPL-MCNC: 101 MG/DL (ref 70–99)
HGB BLD-MCNC: 11.5 G/DL (ref 11.7–15.7)
INR PPP: 1.35 (ref 0.85–1.15)

## 2023-12-01 PROCEDURE — 85610 PROTHROMBIN TIME: CPT | Performed by: ORTHOPAEDIC SURGERY

## 2023-12-01 PROCEDURE — 250N000013 HC RX MED GY IP 250 OP 250 PS 637: Performed by: ORTHOPAEDIC SURGERY

## 2023-12-01 PROCEDURE — 250N000013 HC RX MED GY IP 250 OP 250 PS 637: Performed by: INTERNAL MEDICINE

## 2023-12-01 PROCEDURE — 85018 HEMOGLOBIN: CPT | Performed by: ORTHOPAEDIC SURGERY

## 2023-12-01 PROCEDURE — 99214 OFFICE O/P EST MOD 30 MIN: CPT | Performed by: STUDENT IN AN ORGANIZED HEALTH CARE EDUCATION/TRAINING PROGRAM

## 2023-12-01 PROCEDURE — 82947 ASSAY GLUCOSE BLOOD QUANT: CPT | Performed by: ORTHOPAEDIC SURGERY

## 2023-12-01 PROCEDURE — 36415 COLL VENOUS BLD VENIPUNCTURE: CPT | Performed by: ORTHOPAEDIC SURGERY

## 2023-12-01 RX ORDER — ACETAMINOPHEN 500 MG
1000 TABLET ORAL EVERY 8 HOURS
Qty: 100 TABLET | Refills: 0 | Status: SHIPPED | OUTPATIENT
Start: 2023-12-01

## 2023-12-01 RX ORDER — HYDROXYZINE HYDROCHLORIDE 25 MG/1
25 TABLET, FILM COATED ORAL EVERY 6 HOURS PRN
Qty: 30 TABLET | Refills: 0 | Status: SHIPPED | OUTPATIENT
Start: 2023-12-01

## 2023-12-01 RX ORDER — AMOXICILLIN 250 MG
1 CAPSULE ORAL 2 TIMES DAILY PRN
Qty: 30 TABLET | Refills: 0 | Status: SHIPPED | OUTPATIENT
Start: 2023-12-01

## 2023-12-01 RX ORDER — OXYCODONE HYDROCHLORIDE 5 MG/1
5-10 TABLET ORAL EVERY 4 HOURS PRN
Qty: 30 TABLET | Refills: 0 | Status: SHIPPED | OUTPATIENT
Start: 2023-12-01

## 2023-12-01 RX ADMIN — OXYCODONE HYDROCHLORIDE 10 MG: 5 TABLET ORAL at 04:08

## 2023-12-01 RX ADMIN — METHOCARBAMOL 500 MG: 500 TABLET ORAL at 04:56

## 2023-12-01 RX ADMIN — SENNOSIDES AND DOCUSATE SODIUM 1 TABLET: 8.6; 5 TABLET ORAL at 08:30

## 2023-12-01 RX ADMIN — ACETAMINOPHEN 975 MG: 325 TABLET ORAL at 10:33

## 2023-12-01 RX ADMIN — OXYCODONE HYDROCHLORIDE 5 MG: 5 TABLET ORAL at 11:08

## 2023-12-01 RX ADMIN — THERA TABS 1 TABLET: TAB at 08:30

## 2023-12-01 RX ADMIN — LISINOPRIL AND HYDROCHLOROTHIAZIDE 1 TABLET: 12.5; 2 TABLET ORAL at 08:30

## 2023-12-01 RX ADMIN — ACETAMINOPHEN 975 MG: 325 TABLET ORAL at 04:08

## 2023-12-01 ASSESSMENT — ACTIVITIES OF DAILY LIVING (ADL)
ADLS_ACUITY_SCORE: 24
ADLS_ACUITY_SCORE: 23

## 2023-12-01 NOTE — PLAN OF CARE
Note for RN cares provided 1900 - 2300:  Patient vital signs are at baseline: Yes  Pt has hx HTN, noted SBP 150s.  Pt has CPAP set up for HS use, Pt has CPAP set up for HS use, on continuous pulse oximetry monitor.  Patient able to ambulate as they were prior to admission or with assist devices provided by therapies during their stay:  Yes Up with A x 1, FWW GB and foot flat partial weight bearing to RLE.  Patient MUST void prior to discharge:  Yes  Patient able to tolerate oral intake:  Yes  Pt denies nausea, encouraging PO fluid intake.  Pain has adequate pain control using Oral analgesics:  Yes  Does patient have an identified :  Yes  Has goal D/C date and time been discussed with patient:  Yes

## 2023-12-01 NOTE — DISCHARGE SUMMARY
[facility] Discharge Summary                                       CARINA JOHNSON [id number]   Age: 73 year old  PCP: Vel Grimm, [phone] 1950     Date of Admission:  11/29/2023  Date of Discharge::  12/1/2023  Discharge Physician:  Raudel Garcia PA-C    Code status:  Full Code    Admission Information:  Admission Diagnosis:  Osteoarthritis of right hip [M16.11]  Primary osteoarthritis of right hip [M16.11]    Post-Operative Day: 2 Days Post-Op     Reason for admission:  The patient was admitted for the following:Procedure(s) (LRB):  RIGHT TOTAL HIP ARTHROPLASTY (Right)    Principal Problem:    Primary osteoarthritis of right hip      Allergies:  Patient has no known allergies.    Following the procedure noted above the patient was transferred to the post-op floor and started on:    Therapy:  physical therapy and occupational therapy  Anticoagulation Plan: Resume pre-op coumadin management per primary care provider/hospitalist    Pain Management: scopainmedication: oxycodone and tylenol  Weight bearing status: Partial weight bearing 50% flat foot while ambulating     The patient was followed and co-managed by the hospitalist service during the inpatient treatment course  Complications:  None  Consultations:  None     Pertinent Labs   Lab Results: personally reviewed.     Recent Labs   Lab Test 11/30/23  0743 11/29/23  0625 11/08/23  0917 07/12/23  0845 09/13/22  0843 09/16/20  0817 03/02/20  0508 03/02/20  0110 03/01/20  2050 09/04/19  0911 05/08/19  1153   INR 1.09 1.14  --   --   --   --  2.15*   < >  --   --   --    WBC  --   --   --   --   --   --  9.7  --  11.4*  --  10.2   HGB 12.6  --   --   --   --   --  13.1  --  14.5  --  14.3   HCT  --   --   --   --   --   --  39.7  --  43.8  --  44.2   MCV  --   --   --   --   --   --  89  --  88  --  87   PLT  --   --   --   --   --   --  305  --  346  --  337   NA  --   --  142 143 141   < > 141  --   --    < >  --     <  > = values in this interval not displayed.          Discharge Information:  Condition at discharge: Stable  Discharge destination:  Discharged to home     Medications at discharge:  Current Discharge Medication List        START taking these medications    Details   acetaminophen (TYLENOL) 500 MG tablet Take 2 tablets (1,000 mg) by mouth every 8 hours  Qty: 100 tablet, Refills: 0    Associated Diagnoses: Primary osteoarthritis of right hip      hydrOXYzine (ATARAX) 25 MG tablet Take 1 tablet (25 mg) by mouth every 6 hours as needed for other (adjuvant pain)  Qty: 30 tablet, Refills: 0    Associated Diagnoses: Primary osteoarthritis of right hip      oxyCODONE (ROXICODONE) 5 MG tablet Take 1-2 tablets (5-10 mg) by mouth every 4 hours as needed for moderate pain  Qty: 30 tablet, Refills: 0    Associated Diagnoses: Primary osteoarthritis of right hip      senna-docusate (SENOKOT-S/PERICOLACE) 8.6-50 MG tablet Take 1 tablet by mouth 2 times daily as needed for constipation  Qty: 30 tablet, Refills: 0    Associated Diagnoses: Primary osteoarthritis of right hip           CONTINUE these medications which have NOT CHANGED    Details   albuterol (PROAIR HFA/PROVENTIL HFA/VENTOLIN HFA) 108 (90 Base) MCG/ACT inhaler Inhale 2 puffs into the lungs every 6 hours as needed for shortness of breath, wheezing or cough      alendronate (FOSAMAX) 70 MG tablet Take 70 mg by mouth once a week Takes on Sundays      calcium carbonate (SUPER CALCIUM) 1500 (600 Ca) MG tablet Take 600 mg by mouth daily      cyclobenzaprine (FLEXERIL) 5 MG tablet Take 5 mg by mouth 3 times daily as needed for muscle spasms      diltiazem ER COATED BEADS (CARDIZEM CD/CARTIA XT) 240 MG 24 hr capsule Take 1 capsule by mouth daily.  Qty: 90 capsule, Refills: 1    Associated Diagnoses: Persistent atrial fibrillation (H)      lisinopril-hydrochlorothiazide (ZESTORETIC) 20-12.5 MG tablet Take 1 tablet by mouth daily      multivitamin, therapeutic (THERA-VIT) TABS  tablet Take 1 tablet by mouth daily Stopping 11/22/23 before surgery      Omega-3 1000 MG capsule Take 1,200 mg by mouth daily Stopping 11/22/23 before surgery      warfarin ANTICOAGULANT (COUMADIN) 5 MG tablet Take 2.5-5 mg by mouth daily 2.5 mg on Mondays, 5mg ROW                        Follow-Up Care:  Patient should be seen in a [facility] office in 10-14 days by the patient's Orthopedic Surgeon/Physician Assistant.  Call [phone] for appointment or questions.    Raudel Garcia PA-C

## 2023-12-01 NOTE — PROGRESS NOTES
"[facility] MEDICINE  PROGRESS NOTE       Securely message me with [employer] (more info)    Code Status: Prior  Procedure(s):  RIGHT TOTAL HIP ARTHROPLASTY  2 Days Post-Op  73-year-old female with a history of hypertension, atrial fibrillation, KATHYA underwent a right AKIKO.     Status post right AKIKO  Assessment: Surgery under spinal anesthesia 250  cc  Plan  Pain management, PT and OT orders per orthopedics     Paroxysmal atrial fibrillation  Assessment: EKG 11/8/2023 showed NSR, on Cardizem  mg daily, warfarin with a goal INR 2-3  Plan  Continue Cardizem CD with hold parameters  Warfarin per pharmacy  Will not need bridging for discharge     Essential hypertension  Assessment: BP well-controlled, home medications: Lisinopril-HCTZ 20/12.5 mg daily, Cardizem  mg daily  Plan  Above medication with hold parameters     KATHYA  Assessment: CPAP nightly  Plan  Continue home CPAP        Nausea and vomiting  assessment: Persistent nausea and vomiting this morning, improved with Zofran and then Compazine  Plan  Continue antiemetics  Continue to monitor       Anticoagulation   Warfarin    Therapy:   Loco:Not present  Lines: None       Current Diet  Orders Placed This Encounter      Discharge Instruction - Regular Diet Adult        Clinically Significant Risk Factors Present on Admission               # Drug Induced Coagulation Defect: home medication list includes an anticoagulant medication    # Hypertension: Noted on problem list   # Non-Invasive mechanical ventilation: current O2 Device: BiPAP/CPAP  # Acute hypoxic respiratory failure: continue supplemental O2 as needed     # Obesity: Estimated body mass index is 38.17 kg/m  as calculated from the following:    Height as of this encounter: 1.549 m (5' 1\").    Weight as of this encounter: 91.6 kg (202 lb).              Interval History/Subjective:  Her pain is better.  Nausea has improved.  Planning to eat breakfast.  No other " concerns.    No current facility-administered medications for this encounter.    Physical Exam/Objective:  Temp:  [97.3  F (36.3  C)-98.2  F (36.8  C)] 97.7  F (36.5  C)  Pulse:  [56-65] 56  Resp:  [16-17] 16  BP: (119-157)/(65-74) 119/65  SpO2:  [94 %-97 %] 97 %  Wt Readings from Last 4 Encounters:   11/29/23 91.6 kg (202 lb)   02/15/22 94.3 kg (208 lb)   08/05/20 97.5 kg (215 lb)   09/26/19 93.4 kg (206 lb)     Body mass index is 38.17 kg/m .    General Appearance: Alert and wake, not in distress  Respiratory: clear lungs, no crackles or wheezing  Cardiovascular: rhythmic, normal S1 and S2, no murmur  GI: soft, non-tender, normal bowel sound  Neurology: oriented x 3  Psych: cooperative and calm, normal affect    Medications:   Personally Reviewed.  Medications         Data reviewed today: I personally reviewed all new medications, labs, imaging/diagnostics reports over the past 24 hours. Pertinent findings include:    Imaging:   No results found for this or any previous visit (from the past 24 hour(s)).    Labs:  XR Pelvis Port 1/2 Views   Final Result   IMPRESSION: Status post recent right hip arthroplasty. No immediate hardware complication. Expected postsurgical soft tissue edema, subcutaneous emphysema, and gas containing joint effusion. No acute fracture or malalignment.        Recent Results (from the past 24 hour(s))   Hemoglobin    Collection Time: 12/01/23  8:08 AM   Result Value Ref Range    Hemoglobin 11.5 (L) 11.7 - 15.7 g/dL   INR    Collection Time: 12/01/23  8:08 AM   Result Value Ref Range    INR 1.35 (H) 0.85 - 1.15   Glucose    Collection Time: 12/01/23  8:08 AM   Result Value Ref Range    Glucose 101 (H) 70 - 99 mg/dL    Patient Fasting > 8hrs? No        Pending Labs:  Unresulted Labs Ordered in the Past 30 Days of this Admission       No orders found from 10/30/2023 to 11/30/2023.            HECTOR WALTERS MD  [facility]  Phone:  #[phone]    Securely message me with [employer] (more info)

## 2023-12-01 NOTE — DISCHARGE SUMMARY
Discharge AVS reviewed with patient. Questions answered and teaching acknowledged. Belongings and paperwork sent with.

## 2023-12-01 NOTE — PLAN OF CARE
Goal Outcome Evaluation:    Patient vital signs are at baseline: Yes  Patient able to ambulate as they were prior to admission or with assist devices provided by therapies during their stay:  Yes  Patient MUST void prior to discharge:  Yes  Patient able to tolerate oral intake:  Yes  Pain has adequate pain control using Oral analgesics:  Yes  Does patient have an identified :  Yes  Has goal D/C date and time been discussed with patient:  Yes    VSS on CPAP. Pt reported increase in pain overnight at 0400. PRN oxy given, some relief but pt started experiencing muscle spasms. PRN robaxin given. Pt reports relief with PRNs    Pt ambulating SBA w/ gb and walker. Voiding adequately. Denies nausea. Tolerating diet. Plan to discharge.

## 2023-12-01 NOTE — PROGRESS NOTES
"                  [facility] Orthopaedics Progress Note      Post-operative Day: 2 Days Post-Op    Procedure(s):  RIGHT TOTAL HIP ARTHROPLASTY      Subjective:    Pain: minimal  Chest pain, SOB:  No  Nausea and vomiting have resolved overnight.   She is passing gas and denies any abdominal pain.     Objective:  Blood pressure 132/74, pulse 65, temperature 98.2  F (36.8  C), temperature source Oral, resp. rate 16, height 1.549 m (5' 1\"), weight 91.6 kg (202 lb), SpO2 94%.    Patient Vitals for the past 24 hrs:   BP Temp Temp src Pulse Resp SpO2   12/01/23 0432 132/74 98.2  F (36.8  C) Oral 65 16 94 %   11/30/23 2349 130/69 97.3  F (36.3  C) Oral 65 16 96 %   11/30/23 1900 (!) 157/69 97.5  F (36.4  C) Oral 61 17 94 %   11/30/23 1500 (!) 147/65 97.4  F (36.3  C) Oral 60 17 93 %   11/30/23 1256 125/64 97.4  F (36.3  C) Oral 56 16 94 %   11/30/23 0748 (!) 149/72 97.4  F (36.3  C) Oral 65 16 94 %       Wt Readings from Last 4 Encounters:   11/29/23 91.6 kg (202 lb)   02/15/22 94.3 kg (208 lb)   08/05/20 97.5 kg (215 lb)   09/26/19 93.4 kg (206 lb)         Motor function, sensation, and circulation intact   Yes  Wound status: incisions are clean dry and intact. Yes  Calf tenderness: Bilateral  No    Pertinent Labs   Lab Results: personally reviewed.     Recent Labs   Lab Test 11/30/23  0743 11/29/23  0625 11/08/23  0917 07/12/23  0845 09/13/22  0843 09/16/20  0817 03/02/20  0508 03/02/20  0110 03/01/20  2050 09/04/19  0911 05/08/19  1153   INR 1.09 1.14  --   --   --   --  2.15*   < >  --   --   --    WBC  --   --   --   --   --   --  9.7  --  11.4*  --  10.2   HGB 12.6  --   --   --   --   --  13.1  --  14.5  --  14.3   HCT  --   --   --   --   --   --  39.7  --  43.8  --  44.2   MCV  --   --   --   --   --   --  89  --  88  --  87   PLT  --   --   --   --   --   --  305  --  346  --  337   NA  --   --  142 143 141   < > 141  --   --    < >  --     < > = values in this interval not displayed.       Plan: Anticoagulation " protocol: Resume pre-op warfarin            Pain medications:  scopainmedication: oxycodone and tylenol            Weight bearing status:  50% PWB x 6 weeks, flat foot when ambulating            Disposition:  Home today with OPPT            Continue cares and rehabilitation     Report completed by:  Raudel Garcia PA-C  Date: 12/1/2023  Time: 5:39 AM

## 2023-12-01 NOTE — PROGRESS NOTES
Care Management Discharge Note    Discharge Date: 12/01/2023       Discharge Disposition: Home    Discharge Services: None    Discharge DME: None    Discharge Transportation: family or friend will provide    Education Provided on the Discharge Plan: Yes (AVS per bedside RN)    Persons Notified of Discharge Plans: patient    Patient/Family in Agreement with the Plan: yes        Additional Information:  CM reviewed chart. No CM needs identified or requested. Family to provide transportation home at discharge.     Rand Kc RN

## 2024-03-20 ENCOUNTER — LAB REQUISITION (OUTPATIENT)
Dept: LAB | Facility: CLINIC | Age: 74
End: 2024-03-20

## 2024-03-20 PROCEDURE — 87086 URINE CULTURE/COLONY COUNT: CPT | Performed by: FAMILY MEDICINE

## 2024-03-20 PROCEDURE — 87186 SC STD MICRODIL/AGAR DIL: CPT | Performed by: FAMILY MEDICINE

## 2024-03-21 LAB — BACTERIA UR CULT: ABNORMAL

## 2024-06-09 ENCOUNTER — HEALTH MAINTENANCE LETTER (OUTPATIENT)
Age: 74
End: 2024-06-09

## 2024-07-17 ENCOUNTER — LAB REQUISITION (OUTPATIENT)
Dept: LAB | Facility: CLINIC | Age: 74
End: 2024-07-17

## 2024-07-17 DIAGNOSIS — B35.1 TINEA UNGUIUM: ICD-10-CM

## 2024-07-17 PROCEDURE — 82040 ASSAY OF SERUM ALBUMIN: CPT | Performed by: FAMILY MEDICINE

## 2024-07-18 LAB
ALBUMIN SERPL BCG-MCNC: 4 G/DL (ref 3.5–5.2)
ALP SERPL-CCNC: 73 U/L (ref 40–150)
ALT SERPL W P-5'-P-CCNC: 25 U/L (ref 0–50)
AST SERPL W P-5'-P-CCNC: 26 U/L (ref 0–45)
BILIRUB DIRECT SERPL-MCNC: <0.2 MG/DL (ref 0–0.3)
BILIRUB SERPL-MCNC: 0.2 MG/DL
PROT SERPL-MCNC: 7.4 G/DL (ref 6.4–8.3)

## 2024-10-10 ENCOUNTER — LAB REQUISITION (OUTPATIENT)
Dept: LAB | Facility: CLINIC | Age: 74
End: 2024-10-10

## 2024-10-10 DIAGNOSIS — I10 ESSENTIAL (PRIMARY) HYPERTENSION: ICD-10-CM

## 2024-10-10 DIAGNOSIS — E78.5 HYPERLIPIDEMIA, UNSPECIFIED: ICD-10-CM

## 2024-10-10 LAB
ALBUMIN SERPL BCG-MCNC: 4.2 G/DL (ref 3.5–5.2)
ALP SERPL-CCNC: 69 U/L (ref 40–150)
ALT SERPL W P-5'-P-CCNC: 24 U/L (ref 0–50)
ANION GAP SERPL CALCULATED.3IONS-SCNC: 12 MMOL/L (ref 7–15)
AST SERPL W P-5'-P-CCNC: 30 U/L (ref 0–45)
BILIRUB SERPL-MCNC: 0.3 MG/DL
BUN SERPL-MCNC: 16.2 MG/DL (ref 8–23)
CALCIUM SERPL-MCNC: 10.1 MG/DL (ref 8.8–10.4)
CHLORIDE SERPL-SCNC: 106 MMOL/L (ref 98–107)
CHOLEST SERPL-MCNC: 206 MG/DL
CREAT SERPL-MCNC: 0.89 MG/DL (ref 0.51–0.95)
EGFRCR SERPLBLD CKD-EPI 2021: 67 ML/MIN/1.73M2
FASTING STATUS PATIENT QL REPORTED: ABNORMAL
FASTING STATUS PATIENT QL REPORTED: ABNORMAL
GLUCOSE SERPL-MCNC: 100 MG/DL (ref 70–99)
HCO3 SERPL-SCNC: 24 MMOL/L (ref 22–29)
HDLC SERPL-MCNC: 49 MG/DL
LDLC SERPL CALC-MCNC: 116 MG/DL
NONHDLC SERPL-MCNC: 157 MG/DL
POTASSIUM SERPL-SCNC: 3.9 MMOL/L (ref 3.4–5.3)
PROT SERPL-MCNC: 7.7 G/DL (ref 6.4–8.3)
SODIUM SERPL-SCNC: 142 MMOL/L (ref 135–145)
TRIGL SERPL-MCNC: 206 MG/DL

## 2024-10-10 PROCEDURE — 82040 ASSAY OF SERUM ALBUMIN: CPT | Performed by: FAMILY MEDICINE

## 2024-10-10 PROCEDURE — 82465 ASSAY BLD/SERUM CHOLESTEROL: CPT | Performed by: FAMILY MEDICINE

## 2024-10-22 ENCOUNTER — TELEPHONE (OUTPATIENT)
Dept: RESEARCH | Facility: CLINIC | Age: 74
End: 2024-10-22
Payer: COMMERCIAL

## 2025-03-04 ENCOUNTER — LAB REQUISITION (OUTPATIENT)
Dept: LAB | Facility: CLINIC | Age: 75
End: 2025-03-04
Payer: COMMERCIAL

## 2025-03-04 DIAGNOSIS — L65.9 NONSCARRING HAIR LOSS, UNSPECIFIED: ICD-10-CM

## 2025-03-04 DIAGNOSIS — R53.83 OTHER FATIGUE: ICD-10-CM

## 2025-03-04 LAB
TSH SERPL DL<=0.005 MIU/L-ACNC: 2.38 UIU/ML (ref 0.3–4.2)
VIT B12 SERPL-MCNC: 882 PG/ML (ref 232–1245)

## 2025-03-04 PROCEDURE — 82627 DEHYDROEPIANDROSTERONE: CPT | Mod: ORL | Performed by: FAMILY MEDICINE

## 2025-03-04 PROCEDURE — 82607 VITAMIN B-12: CPT | Mod: ORL | Performed by: FAMILY MEDICINE

## 2025-03-04 PROCEDURE — 84443 ASSAY THYROID STIM HORMONE: CPT | Mod: ORL | Performed by: FAMILY MEDICINE

## 2025-03-04 PROCEDURE — 84403 ASSAY OF TOTAL TESTOSTERONE: CPT | Mod: ORL | Performed by: FAMILY MEDICINE

## 2025-03-05 LAB — DHEA-S SERPL-MCNC: 21 UG/DL (ref 35–430)

## 2025-03-07 LAB — TESTOST SERPL-MCNC: 14 NG/DL (ref 8–60)

## 2025-04-15 ENCOUNTER — LAB REQUISITION (OUTPATIENT)
Dept: LAB | Facility: CLINIC | Age: 75
End: 2025-04-15
Payer: COMMERCIAL

## 2025-04-15 DIAGNOSIS — R19.7 DIARRHEA, UNSPECIFIED: ICD-10-CM

## 2025-04-15 LAB

## 2025-04-16 LAB
O+P STL MICRO: NEGATIVE
SPECIMEN TYPE: NORMAL

## 2025-06-15 ENCOUNTER — HEALTH MAINTENANCE LETTER (OUTPATIENT)
Age: 75
End: 2025-06-15

## (undated) DEVICE — SUCTION MANIFOLD NEPTUNE 2 SYS 4 PORT 0702-020-000

## (undated) DEVICE — ESU ELEC BLADE 6" COATED E1450-6

## (undated) DEVICE — SUTURE MONOCRYL 3-0 18 PS2 UND MCP497G

## (undated) DEVICE — DRAPE IOBAN INCISE 23X17" 6650EZ

## (undated) DEVICE — GLOVE BIOGEL PI INDICATOR 8.0 LF 41680

## (undated) DEVICE — PLATE GROUNDING ADULT W/CORD 9165L

## (undated) DEVICE — SUTURE VICRYL+ 1 27IN CT-1 UND VCP261H

## (undated) DEVICE — GLOVE BIOGEL PI SZ 8.0 40880

## (undated) DEVICE — CUSTOM PACK TOTAL HIP SOP5BTHHEA

## (undated) DEVICE — BLADE SAGITTAL WIDE (SO-618) 2108-118

## (undated) DEVICE — NEEDLE HYPO MAGELLAN SAFETY 22GA 1 1/2IN 8881850215

## (undated) DEVICE — A3 SUPPLIES- SEE NURSING INFO PAGE

## (undated) DEVICE — POSITIONER ABDUCTION PILLOW FOAM MED FP-ABDUCTM

## (undated) DEVICE — BONE CLEANING TIP INTERPULSE  0210-010-000

## (undated) DEVICE — SOL WATER IRRIG 1000ML BOTTLE 2F7114

## (undated) DEVICE — SUTURE VICRYL+ 1 18 CT/CR  VLT VCP753D

## (undated) DEVICE — GOWN IMPERVIOUS BREATHABLE SMART XLG 89045

## (undated) DEVICE — SUCTION SLEEVE NEPTUNE 2 165MM 0703-005-165

## (undated) DEVICE — GLOVE BIOGEL INDICATOR 7.5 LF 41675

## (undated) DEVICE — SOLUTION IRRIG 2B7127 .9NS 3000ML BAG

## (undated) DEVICE — HOLDER LIMB VELCRO OR 0814-1533

## (undated) DEVICE — SUTURE VICRYL+ 2-0 27IN CT-1 UND VCP259H

## (undated) DEVICE — DECANTER VIAL 2006S

## (undated) DEVICE — SOL NACL 0.9% IRRIG 1000ML BOTTLE 2F7124

## (undated) DEVICE — GLOVE BIOGEL PI SZ 7.0 40870

## (undated) DEVICE — POSITIONER HEAD DONUT FOAM 9" LF FP-HEAD9

## (undated) DEVICE — SUTURE PDS 1 CTB-1 ZB347

## (undated) DEVICE — SUCTION IRR SYSTEM W/O TIP INTERPULSE HANDPIECE 0210-100-000

## (undated) RX ORDER — PROPOFOL 10 MG/ML
INJECTION, EMULSION INTRAVENOUS
Status: DISPENSED
Start: 2023-11-29

## (undated) RX ORDER — GLYCOPYRROLATE 0.2 MG/ML
INJECTION, SOLUTION INTRAMUSCULAR; INTRAVENOUS
Status: DISPENSED
Start: 2023-11-29

## (undated) RX ORDER — PHENYLEPHRINE HYDROCHLORIDE 10 MG/ML
INJECTION INTRAVENOUS
Status: DISPENSED
Start: 2023-11-29

## (undated) RX ORDER — FENTANYL CITRATE-0.9 % NACL/PF 10 MCG/ML
PLASTIC BAG, INJECTION (ML) INTRAVENOUS
Status: DISPENSED
Start: 2023-11-29

## (undated) RX ORDER — FENTANYL CITRATE 50 UG/ML
INJECTION, SOLUTION INTRAMUSCULAR; INTRAVENOUS
Status: DISPENSED
Start: 2023-11-29

## (undated) RX ORDER — ONDANSETRON 2 MG/ML
INJECTION INTRAMUSCULAR; INTRAVENOUS
Status: DISPENSED
Start: 2023-11-29

## (undated) RX ORDER — DEXAMETHASONE SODIUM PHOSPHATE 10 MG/ML
INJECTION, EMULSION INTRAMUSCULAR; INTRAVENOUS
Status: DISPENSED
Start: 2023-11-29

## (undated) RX ORDER — EPHEDRINE SULFATE 50 MG/ML
INJECTION, SOLUTION INTRAMUSCULAR; INTRAVENOUS; SUBCUTANEOUS
Status: DISPENSED
Start: 2023-11-29